# Patient Record
Sex: FEMALE | URBAN - METROPOLITAN AREA
[De-identification: names, ages, dates, MRNs, and addresses within clinical notes are randomized per-mention and may not be internally consistent; named-entity substitution may affect disease eponyms.]

---

## 2017-12-19 ENCOUNTER — OFFICE VISIT (OUTPATIENT)
Dept: PRIMARY CARE CLINIC | Age: 47
End: 2017-12-19

## 2017-12-19 VITALS
RESPIRATION RATE: 14 BRPM | SYSTOLIC BLOOD PRESSURE: 124 MMHG | HEART RATE: 103 BPM | BODY MASS INDEX: 27.29 KG/M2 | OXYGEN SATURATION: 99 % | DIASTOLIC BLOOD PRESSURE: 68 MMHG | HEIGHT: 58 IN | TEMPERATURE: 103.1 F | WEIGHT: 130 LBS

## 2017-12-19 DIAGNOSIS — R68.89 FLU-LIKE SYMPTOMS: ICD-10-CM

## 2017-12-19 DIAGNOSIS — J11.1 INFLUENZA: Primary | ICD-10-CM

## 2017-12-19 DIAGNOSIS — J01.90 ACUTE BACTERIAL SINUSITIS: ICD-10-CM

## 2017-12-19 DIAGNOSIS — B96.89 ACUTE BACTERIAL SINUSITIS: ICD-10-CM

## 2017-12-19 PROCEDURE — 87804 INFLUENZA ASSAY W/OPTIC: CPT | Performed by: FAMILY MEDICINE

## 2017-12-19 PROCEDURE — G8484 FLU IMMUNIZE NO ADMIN: HCPCS | Performed by: FAMILY MEDICINE

## 2017-12-19 PROCEDURE — 1036F TOBACCO NON-USER: CPT | Performed by: FAMILY MEDICINE

## 2017-12-19 PROCEDURE — G8419 CALC BMI OUT NRM PARAM NOF/U: HCPCS | Performed by: FAMILY MEDICINE

## 2017-12-19 PROCEDURE — 99203 OFFICE O/P NEW LOW 30 MIN: CPT | Performed by: FAMILY MEDICINE

## 2017-12-19 PROCEDURE — G8427 DOCREV CUR MEDS BY ELIG CLIN: HCPCS | Performed by: FAMILY MEDICINE

## 2017-12-19 PROCEDURE — 96372 THER/PROPH/DIAG INJ SC/IM: CPT | Performed by: FAMILY MEDICINE

## 2017-12-19 RX ORDER — AZITHROMYCIN 250 MG/1
TABLET, FILM COATED ORAL
Qty: 1 PACKET | Refills: 0 | Status: SHIPPED | OUTPATIENT
Start: 2017-12-19 | End: 2017-12-29

## 2017-12-19 RX ORDER — OSELTAMIVIR PHOSPHATE 75 MG/1
CAPSULE ORAL
Qty: 10 CAPSULE | Refills: 0 | Status: SHIPPED | OUTPATIENT
Start: 2017-12-19 | End: 2018-02-13

## 2017-12-19 RX ORDER — IBUPROFEN 800 MG/1
TABLET ORAL
Qty: 50 TABLET | Refills: 1 | Status: SHIPPED | OUTPATIENT
Start: 2017-12-19 | End: 2020-06-13 | Stop reason: ALTCHOICE

## 2017-12-19 RX ORDER — CEFTRIAXONE 1 G/1
1 INJECTION, POWDER, FOR SOLUTION INTRAMUSCULAR; INTRAVENOUS ONCE
Status: COMPLETED | OUTPATIENT
Start: 2017-12-19 | End: 2017-12-19

## 2017-12-19 RX ADMIN — CEFTRIAXONE 1 G: 1 INJECTION, POWDER, FOR SOLUTION INTRAMUSCULAR; INTRAVENOUS at 18:18

## 2017-12-19 ASSESSMENT — ENCOUNTER SYMPTOMS
SHORTNESS OF BREATH: 0
CHEST TIGHTNESS: 0

## 2017-12-19 NOTE — PROGRESS NOTES
No acute respiratory distress. Alert and oriented times 3. No skin rashes. HEENT:  TMs normal bilaterally. Canals and ears normal. Pharynx is clear. Extraocular eye motions intact and pain free. Pupils reactive and equally round. Sclerae and conjunctivae clear, normocephalic and atraumatic. RESPIRATORY:  Clear and equal breath sounds with no acute respiratory distress. HEART: Regular rhythm without murmur, rub or gallop. ABDOMEN:  soft, nontender. No masses, guarding or rebound. Normoactive bowel sounds. LOW BACK: No tenderness to palpation of inferior lumbar spine or either sacroiliac joint area. Assessment/Plan  Martín Mercado was seen today for establish care and generalized body aches. Diagnoses and all orders for this visit:    Influenza  -     cefTRIAXone (ROCEPHIN) injection 1 g; Inject 1 g into the muscle once    Acute bacterial sinusitis    Flu-like symptoms  -     POCT Influenza A/B    Other orders  -     oseltamivir (TAMIFLU) 75 MG capsule; bid  -     azithromycin (ZITHROMAX) 250 MG tablet; Take 2 tabs (500 mg) on Day 1, and take 1 tab (250 mg) on days 2 through 5.  -     ibuprofen (ADVIL;MOTRIN) 800 MG tablet; Tid prn       Symptoms and complaints should be improving over 48 hours and entirely resolved in two weeks, if not, patient should call the office for a follow-up appointment. Health Maintenance Due   Topic Date Due    HIV screen  11/20/1986    DTaP/Tdap/Td vaccine (1 - Tdap) 11/20/1990    Cervical cancer screen  11/20/1992    Lipid screen  11/20/2011    Diabetes screen  11/20/2011    Flu vaccine (1) 09/01/2017       Controlled Substances Monitoring:      Return in about 2 weeks (around 1/2/2018).     Ping Wheatley MD

## 2018-01-07 ASSESSMENT — ENCOUNTER SYMPTOMS: ABDOMINAL PAIN: 0

## 2018-01-09 LAB
INFLUENZA A ANTIBODY: NORMAL
INFLUENZA B ANTIBODY: NORMAL

## 2018-02-13 ENCOUNTER — TELEPHONE (OUTPATIENT)
Dept: PRIMARY CARE CLINIC | Age: 48
End: 2018-02-13

## 2018-02-13 ENCOUNTER — OFFICE VISIT (OUTPATIENT)
Dept: PRIMARY CARE CLINIC | Age: 48
End: 2018-02-13
Payer: COMMERCIAL

## 2018-02-13 VITALS
SYSTOLIC BLOOD PRESSURE: 122 MMHG | DIASTOLIC BLOOD PRESSURE: 70 MMHG | BODY MASS INDEX: 27.29 KG/M2 | HEIGHT: 58 IN | HEART RATE: 88 BPM | RESPIRATION RATE: 14 BRPM | WEIGHT: 130 LBS | OXYGEN SATURATION: 98 % | TEMPERATURE: 97.8 F

## 2018-02-13 DIAGNOSIS — J40 BRONCHITIS: Primary | ICD-10-CM

## 2018-02-13 DIAGNOSIS — J20.9 ACUTE BRONCHITIS, UNSPECIFIED ORGANISM: ICD-10-CM

## 2018-02-13 DIAGNOSIS — R68.89 FLU-LIKE SYMPTOMS: ICD-10-CM

## 2018-02-13 DIAGNOSIS — R50.9 FEVER, UNSPECIFIED FEVER CAUSE: Primary | ICD-10-CM

## 2018-02-13 DIAGNOSIS — J02.0 STREP THROAT: ICD-10-CM

## 2018-02-13 LAB
INFLUENZA A ANTIBODY: NORMAL
INFLUENZA B ANTIBODY: NORMAL
S PYO AG THROAT QL: POSITIVE

## 2018-02-13 PROCEDURE — 96372 THER/PROPH/DIAG INJ SC/IM: CPT | Performed by: FAMILY MEDICINE

## 2018-02-13 PROCEDURE — 99213 OFFICE O/P EST LOW 20 MIN: CPT | Performed by: FAMILY MEDICINE

## 2018-02-13 PROCEDURE — G8419 CALC BMI OUT NRM PARAM NOF/U: HCPCS | Performed by: FAMILY MEDICINE

## 2018-02-13 PROCEDURE — 87804 INFLUENZA ASSAY W/OPTIC: CPT | Performed by: FAMILY MEDICINE

## 2018-02-13 PROCEDURE — 1036F TOBACCO NON-USER: CPT | Performed by: FAMILY MEDICINE

## 2018-02-13 PROCEDURE — G8484 FLU IMMUNIZE NO ADMIN: HCPCS | Performed by: FAMILY MEDICINE

## 2018-02-13 PROCEDURE — G8427 DOCREV CUR MEDS BY ELIG CLIN: HCPCS | Performed by: FAMILY MEDICINE

## 2018-02-13 PROCEDURE — 87880 STREP A ASSAY W/OPTIC: CPT | Performed by: FAMILY MEDICINE

## 2018-02-13 RX ORDER — PROMETHAZINE HYDROCHLORIDE AND CODEINE PHOSPHATE 6.25; 1 MG/5ML; MG/5ML
5 SYRUP ORAL EVERY 4 HOURS PRN
Qty: 120 ML | Refills: 0 | Status: SHIPPED | OUTPATIENT
Start: 2018-02-13 | End: 2018-02-20

## 2018-02-13 RX ORDER — OSELTAMIVIR PHOSPHATE 75 MG/1
75 CAPSULE ORAL 2 TIMES DAILY
Qty: 10 CAPSULE | Refills: 0 | Status: SHIPPED | OUTPATIENT
Start: 2018-02-13 | End: 2018-02-18

## 2018-02-13 RX ORDER — BENZONATATE 100 MG/1
100 CAPSULE ORAL 3 TIMES DAILY PRN
Qty: 50 CAPSULE | Refills: 0 | Status: SHIPPED | OUTPATIENT
Start: 2018-02-13 | End: 2018-02-20

## 2018-02-13 RX ORDER — CEFDINIR 300 MG/1
300 CAPSULE ORAL 2 TIMES DAILY
Qty: 20 CAPSULE | Refills: 0 | Status: SHIPPED | OUTPATIENT
Start: 2018-02-13 | End: 2018-02-23

## 2018-02-13 RX ORDER — CEFTRIAXONE 1 G/1
1 INJECTION, POWDER, FOR SOLUTION INTRAMUSCULAR; INTRAVENOUS ONCE
Status: COMPLETED | OUTPATIENT
Start: 2018-02-13 | End: 2018-02-13

## 2018-02-13 RX ADMIN — CEFTRIAXONE 1 G: 1 INJECTION, POWDER, FOR SOLUTION INTRAMUSCULAR; INTRAVENOUS at 13:41

## 2018-02-13 ASSESSMENT — PATIENT HEALTH QUESTIONNAIRE - PHQ9
2. FEELING DOWN, DEPRESSED OR HOPELESS: 0
SUM OF ALL RESPONSES TO PHQ QUESTIONS 1-9: 0
1. LITTLE INTEREST OR PLEASURE IN DOING THINGS: 0
SUM OF ALL RESPONSES TO PHQ9 QUESTIONS 1 & 2: 0

## 2018-02-13 ASSESSMENT — ENCOUNTER SYMPTOMS
RHINORRHEA: 1
SORE THROAT: 1
COUGH: 1
SHORTNESS OF BREATH: 0

## 2018-02-13 NOTE — PROGRESS NOTES
Subjective:      Patient ID: Veronica Perez is a 56 y.o. female who presents today for:  Chief Complaint   Patient presents with    Cough     Pt is here for fever, sore throat, congestion, swollen glands, body aches, cough, pt also c/o L ear pain. x since        Cough   This is a new problem. The current episode started in the past 7 days. The problem has been unchanged. The cough is productive of sputum. Associated symptoms include ear pain (left ear), a fever, myalgias, nasal congestion, rhinorrhea and a sore throat. Pertinent negatives include no chest pain, chills, headaches or shortness of breath. Nothing aggravates the symptoms. She has tried nothing for the symptoms. The treatment provided no relief. No past medical history on file. Past Surgical History:   Procedure Laterality Date     SECTION       No family history on file. Social History     Social History    Marital status: Unknown     Spouse name: N/A    Number of children: N/A    Years of education: N/A     Occupational History    Not on file. Social History Main Topics    Smoking status: Never Smoker    Smokeless tobacco: Never Used    Alcohol use No    Drug use: No    Sexual activity: Not on file     Other Topics Concern    Not on file     Social History Narrative    No narrative on file     Allergies: Other    Review of Systems   Constitutional: Positive for fever. Negative for chills. HENT: Positive for ear pain (left ear), rhinorrhea and sore throat. Respiratory: Positive for cough. Negative for shortness of breath. Cardiovascular: Negative for chest pain. Musculoskeletal: Positive for myalgias and neck pain. Neurological: Negative for dizziness and headaches.        Objective:   /70 (Site: Left Arm, Position: Sitting, Cuff Size: Medium Adult)   Pulse 88   Temp 97.8 °F (36.6 °C) (Tympanic)   Resp 14   Ht 4' 10\" (1.473 m)   Wt 130 lb (59 kg)   SpO2 98%   BMI 27.17 kg/m²     Physical

## 2018-02-13 NOTE — TELEPHONE ENCOUNTER
Pharmacy called, pt has a codeine/vicodin allergy. Her throat closed after taking vicodin in the past.  Do you want to send something else to the pharmacy?

## 2018-02-26 ENCOUNTER — OFFICE VISIT (OUTPATIENT)
Dept: PRIMARY CARE CLINIC | Age: 48
End: 2018-02-26
Payer: COMMERCIAL

## 2018-02-26 VITALS
HEIGHT: 58 IN | DIASTOLIC BLOOD PRESSURE: 70 MMHG | HEART RATE: 93 BPM | OXYGEN SATURATION: 97 % | WEIGHT: 131.7 LBS | SYSTOLIC BLOOD PRESSURE: 108 MMHG | TEMPERATURE: 97.6 F | BODY MASS INDEX: 27.65 KG/M2 | RESPIRATION RATE: 16 BRPM

## 2018-02-26 DIAGNOSIS — E78.5 HYPERLIPIDEMIA, UNSPECIFIED HYPERLIPIDEMIA TYPE: ICD-10-CM

## 2018-02-26 DIAGNOSIS — E55.9 VITAMIN D DEFICIENCY: ICD-10-CM

## 2018-02-26 DIAGNOSIS — R53.83 FATIGUE, UNSPECIFIED TYPE: ICD-10-CM

## 2018-02-26 DIAGNOSIS — F43.9 STRESS: Primary | ICD-10-CM

## 2018-02-26 DIAGNOSIS — E03.9 HYPOTHYROIDISM, UNSPECIFIED TYPE: ICD-10-CM

## 2018-02-26 LAB
ALBUMIN SERPL-MCNC: 4.4 G/DL (ref 3.9–4.9)
ALP BLD-CCNC: 48 U/L (ref 40–130)
ALT SERPL-CCNC: 16 U/L (ref 0–33)
ANION GAP SERPL CALCULATED.3IONS-SCNC: 18 MEQ/L (ref 7–13)
AST SERPL-CCNC: 17 U/L (ref 0–35)
BASOPHILS ABSOLUTE: 0.1 K/UL (ref 0–0.2)
BASOPHILS RELATIVE PERCENT: 0.8 %
BILIRUB SERPL-MCNC: 0.2 MG/DL (ref 0–1.2)
BUN BLDV-MCNC: 12 MG/DL (ref 6–20)
CALCIUM SERPL-MCNC: 8.9 MG/DL (ref 8.6–10.2)
CHLORIDE BLD-SCNC: 98 MEQ/L (ref 98–107)
CHOLESTEROL, TOTAL: 207 MG/DL (ref 0–199)
CO2: 24 MEQ/L (ref 22–29)
CREAT SERPL-MCNC: 0.39 MG/DL (ref 0.5–0.9)
EOSINOPHILS ABSOLUTE: 0.7 K/UL (ref 0–0.7)
EOSINOPHILS RELATIVE PERCENT: 5.5 %
GFR AFRICAN AMERICAN: >60
GFR NON-AFRICAN AMERICAN: >60
GLOBULIN: 3.8 G/DL (ref 2.3–3.5)
GLUCOSE BLD-MCNC: 54 MG/DL (ref 74–109)
HCT VFR BLD CALC: 37.2 % (ref 37–47)
HDLC SERPL-MCNC: 36 MG/DL (ref 40–59)
HEMOGLOBIN: 11.8 G/DL (ref 12–16)
IRON SATURATION: 41 % (ref 11–46)
IRON: 112 UG/DL (ref 37–145)
LDL CHOLESTEROL CALCULATED: ABNORMAL MG/DL (ref 0–129)
LYMPHOCYTES ABSOLUTE: 3.5 K/UL (ref 1–4.8)
LYMPHOCYTES RELATIVE PERCENT: 28.9 %
MCH RBC QN AUTO: 25.6 PG (ref 27–31.3)
MCHC RBC AUTO-ENTMCNC: 31.8 % (ref 33–37)
MCV RBC AUTO: 80.4 FL (ref 82–100)
MONOCYTES ABSOLUTE: 0.5 K/UL (ref 0.2–0.8)
MONOCYTES RELATIVE PERCENT: 3.8 %
NEUTROPHILS ABSOLUTE: 7.3 K/UL (ref 1.4–6.5)
NEUTROPHILS RELATIVE PERCENT: 61 %
PDW BLD-RTO: 15 % (ref 11.5–14.5)
PLATELET # BLD: 331 K/UL (ref 130–400)
POTASSIUM SERPL-SCNC: 4.1 MEQ/L (ref 3.5–5.1)
RBC # BLD: 4.63 M/UL (ref 4.2–5.4)
SODIUM BLD-SCNC: 140 MEQ/L (ref 132–144)
T4 TOTAL: 3.9 UG/DL (ref 4.5–11.7)
TOTAL IRON BINDING CAPACITY: 272 UG/DL (ref 178–450)
TOTAL PROTEIN: 8.2 G/DL (ref 6.4–8.1)
TRIGL SERPL-MCNC: 532 MG/DL (ref 0–200)
TSH SERPL DL<=0.05 MIU/L-ACNC: 5.17 UIU/ML (ref 0.27–4.2)
VITAMIN D 25-HYDROXY: 24.6 NG/ML (ref 30–100)
WBC # BLD: 12 K/UL (ref 4.8–10.8)

## 2018-02-26 PROCEDURE — G8419 CALC BMI OUT NRM PARAM NOF/U: HCPCS | Performed by: FAMILY MEDICINE

## 2018-02-26 PROCEDURE — 99214 OFFICE O/P EST MOD 30 MIN: CPT | Performed by: FAMILY MEDICINE

## 2018-02-26 PROCEDURE — 1036F TOBACCO NON-USER: CPT | Performed by: FAMILY MEDICINE

## 2018-02-26 PROCEDURE — G8427 DOCREV CUR MEDS BY ELIG CLIN: HCPCS | Performed by: FAMILY MEDICINE

## 2018-02-26 PROCEDURE — G8484 FLU IMMUNIZE NO ADMIN: HCPCS | Performed by: FAMILY MEDICINE

## 2018-02-26 RX ORDER — PNV NO.95/FERROUS FUM/FOLIC AC 28MG-0.8MG
TABLET ORAL
Qty: 90 TABLET | Refills: 1 | Status: SHIPPED | OUTPATIENT
Start: 2018-02-26 | End: 2020-06-13 | Stop reason: ALTCHOICE

## 2018-02-26 RX ORDER — CITALOPRAM 20 MG/1
TABLET ORAL
Qty: 30 TABLET | Refills: 1 | Status: SHIPPED | OUTPATIENT
Start: 2018-02-26 | End: 2020-06-13 | Stop reason: ALTCHOICE

## 2018-02-26 ASSESSMENT — ENCOUNTER SYMPTOMS
SHORTNESS OF BREATH: 0
ABDOMINAL PAIN: 0
COLOR CHANGE: 0
HEMOPTYSIS: 0
RHINORRHEA: 0
PHOTOPHOBIA: 0
APNEA: 0
SORE THROAT: 0
FACIAL SWELLING: 0
CHOKING: 0
STRIDOR: 0
NAUSEA: 0
CHEST TIGHTNESS: 0
COUGH: 1
EYE REDNESS: 0
CONSTIPATION: 0
DIARRHEA: 0
EYE DISCHARGE: 0
EYE PAIN: 0
HEARTBURN: 0
WHEEZING: 0

## 2018-02-26 NOTE — PROGRESS NOTES
hemoptysis, choking, chest tightness, shortness of breath, wheezing and stridor. Cardiovascular: Negative for chest pain, palpitations and leg swelling. Gastrointestinal: Negative for abdominal pain, constipation, diarrhea, heartburn and nausea. Endocrine: Negative for cold intolerance, heat intolerance, polydipsia and polyphagia. Genitourinary: Negative for dysuria and frequency. Musculoskeletal: Negative for gait problem, joint swelling, myalgias, neck pain and neck stiffness. Skin: Negative for color change, pallor, rash and wound. Allergic/Immunologic: Negative for immunocompromised state. Neurological: Negative for tremors, syncope, facial asymmetry, speech difficulty and headaches. Psychiatric/Behavioral: Negative for confusion and hallucinations. The patient is not nervous/anxious and is not hyperactive. Objective:   /70 (Site: Left Arm, Position: Sitting, Cuff Size: Medium Adult)   Pulse 93   Temp 97.6 °F (36.4 °C) (Temporal)   Resp 16   Ht 4' 10\" (1.473 m)   Wt 131 lb 11.2 oz (59.7 kg)   SpO2 97%   BMI 27.53 kg/m²     Physical Exam      PHYSICAL EXAMINATION:   VITAL SIGNS: are as recorded. GENERAL:  The patient appears well nourished and well-developed, and with normal affect. No acute respiratory distress. Alert and oriented times 3. No skin rashes. HEENT:  TMs normal bilaterally. Canals and ears normal. Pharynx is clear. Extraocular eye motions intact and pain free. Pupils reactive and equally round. Sclerae and conjunctivae clear, normocephalic and atraumatic. RESPIRATORY:  Clear and equal breath sounds with no acute respiratory distress. HEART: Regular rhythm without murmur, rub or gallop. ABDOMEN:  soft, nontender. No masses, guarding or rebound. Normoactive bowel sounds. LOW BACK: No tenderness to palpation of inferior lumbar spine or either sacroiliac joint area. Assessment:     1. Stress  citalopram (CELEXA) 20 MG tablet   2. Fatigue, unspecified type  CBC Auto Differential    Comprehensive Metabolic Panel    Iron and TIBC   3. Vitamin D deficiency  Vitamin D 25 Hydroxy   4. Hypothyroidism, unspecified type  TSH Without Reflex    T4    T3, Uptake   5. Hyperlipidemia, unspecified hyperlipidemia type  Lipid Panel    Prenatal Vit-Fe Fumarate-FA (PRENATAL VITAMINS) 28-0.8 MG TABS       Plan:      Orders Placed This Encounter   Procedures    CBC Auto Differential     Standing Status:   Future     Number of Occurrences:   1     Standing Expiration Date:   2/26/2019    Comprehensive Metabolic Panel     Standing Status:   Future     Number of Occurrences:   1     Standing Expiration Date:   2/26/2019    Lipid Panel     Standing Status:   Future     Number of Occurrences:   1     Standing Expiration Date:   3/26/2018     Order Specific Question:   Is Patient Fasting?/# of Hours     Answer:   yes    TSH Without Reflex     Standing Status:   Future     Number of Occurrences:   1     Standing Expiration Date:   2/26/2019    T4     Standing Status:   Future     Number of Occurrences:   1     Standing Expiration Date:   2/26/2019    T3, Uptake     Standing Status:   Future     Number of Occurrences:   1     Standing Expiration Date:   2/26/2019    Vitamin D 25 Hydroxy     Standing Status:   Future     Number of Occurrences:   1     Standing Expiration Date:   2/26/2019    Iron and TIBC     Standing Status:   Future     Number of Occurrences:   1     Standing Expiration Date:   2/26/2019     Order Specific Question:   Is Patient Fasting? Answer:   yes     Order Specific Question:   No of Hours?      Answer:   4     Orders Placed This Encounter   Medications    citalopram (CELEXA) 20 MG tablet     Sig: One in the morning, one in the afternoon     Dispense:  30 tablet     Refill:  1    Prenatal Vit-Fe Fumarate-FA (PRENATAL VITAMINS) 28-0.8 MG TABS     Sig: qd     Dispense:  90 tablet     Refill:  1     See labs 2/26     stable    Vit d next    Synthroid next    Controlled Substances Monitoring:      Return in about 3 weeks (around 3/19/2018). RACHEL Alejandro  , am scribing for and in the presence of eGnoveva Sneed MD. Electronically signed by :  Stan Ferrara MD, personally performed the services described in this documentation, as scribed by RACHEL Silva   in my presence, and it is both accurate and complete.  Electronically signed by: Genoveva Sneed MD    3/5/18 10:50 PM    Genoveva Sneed MD

## 2018-02-28 LAB — T3 UPTAKE: 37 % (ref 28–41)

## 2020-06-13 ENCOUNTER — OFFICE VISIT (OUTPATIENT)
Dept: ENDOCRINOLOGY | Age: 50
End: 2020-06-13
Payer: COMMERCIAL

## 2020-06-13 VITALS
WEIGHT: 135 LBS | BODY MASS INDEX: 28.34 KG/M2 | HEART RATE: 68 BPM | DIASTOLIC BLOOD PRESSURE: 76 MMHG | SYSTOLIC BLOOD PRESSURE: 124 MMHG | HEIGHT: 58 IN

## 2020-06-13 PROCEDURE — G8419 CALC BMI OUT NRM PARAM NOF/U: HCPCS | Performed by: INTERNAL MEDICINE

## 2020-06-13 PROCEDURE — G8427 DOCREV CUR MEDS BY ELIG CLIN: HCPCS | Performed by: INTERNAL MEDICINE

## 2020-06-13 PROCEDURE — 99243 OFF/OP CNSLTJ NEW/EST LOW 30: CPT | Performed by: INTERNAL MEDICINE

## 2020-06-13 RX ORDER — CHOLECALCIFEROL (VITAMIN D3) 1250 MCG
CAPSULE ORAL
COMMUNITY

## 2020-06-13 RX ORDER — RIBOFLAVIN (VITAMIN B2) 100 MG
100 TABLET ORAL DAILY
COMMUNITY

## 2020-06-13 RX ORDER — MELOXICAM 7.5 MG/1
7.5 TABLET ORAL DAILY
COMMUNITY

## 2020-06-13 RX ORDER — LEVOTHYROXINE SODIUM 0.03 MG/1
25 TABLET ORAL DAILY
COMMUNITY
End: 2020-06-13 | Stop reason: SDUPTHER

## 2020-06-13 RX ORDER — LEVOTHYROXINE SODIUM 0.05 MG/1
25 TABLET ORAL DAILY
Qty: 30 TABLET | Refills: 3 | Status: SHIPPED | OUTPATIENT
Start: 2020-06-13

## 2020-06-13 RX ORDER — CHLORAL HYDRATE 500 MG
1000 CAPSULE ORAL DAILY
COMMUNITY

## 2020-06-13 RX ORDER — EZETIMIBE AND SIMVASTATIN 10; 20 MG/1; MG/1
1 TABLET ORAL NIGHTLY
COMMUNITY

## 2020-06-13 ASSESSMENT — ENCOUNTER SYMPTOMS
SWOLLEN GLANDS: 0
SHORTNESS OF BREATH: 1

## 2020-06-13 NOTE — PROGRESS NOTES
Subjective:      Patient ID: Irais Shannon is a 51 y.o. female. Patient referred here for management of hypothyroidism patient complains of fatigue weight gain and joint pains in her hands knees and lower extremities she also has symptoms of radiculopathy of both upper extremities left worse than right scheduled to have an EMG done which was done through primary care had labs done recently at [facility]  In 5/20 [address] TSH was 4.3-year total T4 was in the low normal range started on Synthroid 25 mcg daily patient also complains of some allergy symptoms shortness of breath TSH done 2 years ago was slightly elevated also had cholesterol of 247 triglyceride was 532 patient on Vytorin   Other   This is a new (     Hypothyroidism) problem. The current episode started more than 1 year ago. The problem has been waxing and waning. Associated symptoms include arthralgias, fatigue, joint swelling and myalgias. Pertinent negatives include no anorexia, chest pain, neck pain or swollen glands. Nothing aggravates the symptoms. Treatments tried:       Synthroid. The treatment provided mild relief. Hyperlipidemia   This is a new problem. Associated symptoms include myalgias and shortness of breath. Pertinent negatives include no chest pain. Current antihyperlipidemic treatment includes statins and ezetimibe. Results for Bebe Hammans (MRN [id number]) as of 6/13/2020 19:30   Ref.  Range 2/26/2018 12:21   Sodium Latest Ref Range: 132 - 144 mEq/L 140   Potassium Latest Ref Range: 3.5 - 5.1 mEq/L 4.1   Chloride Latest Ref Range: 98 - 107 mEq/L 98   CO2 Latest Ref Range: 22 - 29 mEq/L 24   BUN Latest Ref Range: 6 - 20 mg/dL 12   Creatinine Latest Ref Range: 0.50 - 0.90 mg/dL 0.39 (L)   Anion Gap Latest Ref Range: 7 - 13 mEq/L 18 (H)   GFR Non- Latest Ref Range: >60  >60.0   GFR African American Latest Ref Range: >60  >60.0   Glucose Latest Ref Range: 74 - 109 mg/dL 54 (L)   Calcium Latest Ref Range: 8.6 - 10.2 mg/dL 8.9   Total Protein Latest Ref Range: 6.4 - 8.1 g/dL 8.2 (H)   Cholesterol, Total Latest Ref Range: 0 - 199 mg/dL 207 (H)   HDL Cholesterol Latest Ref Range: 40 - 59 mg/dL 36 (L)   LDL Calculated Latest Ref Range: 0 - 129 mg/dL see below   Triglycerides Latest Ref Range: 0 - 200 mg/dL 532 (H)   Albumin Latest Ref Range: 3.9 - 4.9 g/dL 4.4   Globulin Latest Ref Range: 2.3 - 3.5 g/dL 3.8 (H)   Alk Phos Latest Ref Range: 40 - 130 U/L 48   ALT Latest Ref Range: 0 - 33 U/L 16   AST Latest Ref Range: 0 - 35 U/L 17   Bilirubin Latest Ref Range: 0.0 - 1.2 mg/dL 0.2   T4, Total Latest Ref Range: 4.5 - 11.7 ug/dL 3.9 (L)   TSH Latest Ref Range: 0.270 - 4.200 uIU/mL 5.170 (H)         Patient Active Problem List   Diagnosis    Vitamin D deficiency    Stress    Hypothyroidism    Hyperlipidemia     Social History     Socioeconomic History    Marital status: Unknown     Spouse name: Not on file    Number of children: Not on file    Years of education: Not on file    Highest education level: Not on file   Occupational History    Not on file   Social Needs    Financial resource strain: Not on file    Food insecurity     Worry: Not on file     Inability: Not on file    Transportation needs     Medical: Not on file     Non-medical: Not on file   Tobacco Use    Smoking status: Never Smoker    Smokeless tobacco: Never Used   Substance and Sexual Activity    Alcohol use: No    Drug use: No    Sexual activity: Not on file   Lifestyle    Physical activity     Days per week: Not on file     Minutes per session: Not on file    Stress: Not on file   Relationships    Social connections     Talks on phone: Not on file     Gets together: Not on file     Attends [employer] service: Not on file     Active member of club or organization: Not on file     Attends meetings of clubs or organizations: Not on file     Relationship status: Not on file    Intimate partner violence     Fear of current or ex partner: Not on file     Emotionally abused: Not on file     Physically abused: Not on file     Forced sexual activity: Not on file   Other Topics Concern    Not on file   Social History Narrative    Not on file     Past Surgical History:   Procedure Laterality Date     SECTION  2011     History reviewed. No pertinent family history. Allergies   Allergen Reactions    Other        Current Outpatient Medications:     Ascorbic Acid (VITAMIN C) 100 MG tablet, Take 100 mg by mouth daily, Disp: , Rfl:     Cholecalciferol (VITAMIN D3) 1.25 MG (39892 UT) CAPS, Take by mouth, Disp: , Rfl:     ezetimibe-simvastatin (VYTORIN) 10-20 MG per tablet, Take 1 tablet by mouth nightly, Disp: , Rfl:     Omega-3 Fatty Acids (FISH OIL) 1000 MG CAPS, Take 1,000 mg by mouth daily, Disp: , Rfl:     levothyroxine (SYNTHROID) 25 MCG tablet, Take 25 mcg by mouth Daily, Disp: , Rfl:     meloxicam (MOBIC) 7.5 MG tablet, Take 7.5 mg by mouth daily, Disp: , Rfl:     Review of Systems   Constitutional: Positive for fatigue. Respiratory: Positive for shortness of breath. Cardiovascular: Negative for chest pain. Gastrointestinal: Negative for anorexia. Endocrine: Negative for cold intolerance and heat intolerance. Musculoskeletal: Positive for arthralgias, joint swelling and myalgias. Negative for neck pain. Allergic/Immunologic: Positive for environmental allergies. Psychiatric/Behavioral: Positive for dysphoric mood. All other systems reviewed and are negative. Vitals:    20 1008   BP: 124/76   Site: Left Upper Arm   Position: Sitting   Cuff Size: Medium Adult   Pulse: 68   Weight: 135 lb (61.2 kg)   Height: 4' 10\" (1.473 m)       Objective:   Physical Exam  Constitutional:       Appearance: Normal appearance. She is normal weight. HENT:      Head: Normocephalic and atraumatic.       Right Ear: External ear normal.      Left Ear: External ear normal.      Nose: Nose normal.      Mouth/Throat:      Mouth: Mucous Pulmonology     Number of Visits Requested:   1     Increase  Synthroid dose to 50 mcg refer patient to pulmonary for further studies of pulmonary function tests will also get antibodies for Hashimoto thyroiditis and also rheumatoid colitis patient recommended to go ahead and get testing done for EMG nerve conduction study for possible carpal tunnel as as well as radiculopathy  Patient has been hypoparathyroid we have high triglycerides  Orders Placed This Encounter   Medications    levothyroxine (SYNTHROID) 50 MCG tablet     Sig: Take 0.5 tablets by mouth Daily     Dispense:  30 tablet     Refill:  03     More than 50% of 40 minutes spent in patient education, review of lab data thank you for the referral Dr. Mary Castro MD

## 2023-07-05 DIAGNOSIS — E55.9 VITAMIN D DEFICIENCY: ICD-10-CM

## 2023-07-05 RX ORDER — ERGOCALCIFEROL 1.25 MG/1
CAPSULE ORAL
Qty: 12 CAPSULE | Refills: 0 | Status: SHIPPED | OUTPATIENT
Start: 2023-07-05

## 2024-03-05 ENCOUNTER — APPOINTMENT (OUTPATIENT)
Dept: PRIMARY CARE | Facility: CLINIC | Age: 54
End: 2024-03-05
Payer: COMMERCIAL

## 2024-03-12 ENCOUNTER — OFFICE VISIT (OUTPATIENT)
Dept: PRIMARY CARE | Facility: CLINIC | Age: 54
End: 2024-03-12
Payer: COMMERCIAL

## 2024-03-12 VITALS
SYSTOLIC BLOOD PRESSURE: 110 MMHG | DIASTOLIC BLOOD PRESSURE: 72 MMHG | HEIGHT: 58 IN | WEIGHT: 137 LBS | HEART RATE: 62 BPM | BODY MASS INDEX: 28.76 KG/M2 | TEMPERATURE: 98.2 F | RESPIRATION RATE: 15 BRPM | OXYGEN SATURATION: 99 %

## 2024-03-12 DIAGNOSIS — R53.83 FATIGUE, UNSPECIFIED TYPE: ICD-10-CM

## 2024-03-12 DIAGNOSIS — T78.40XA ALLERGIC REACTION, INITIAL ENCOUNTER: Primary | ICD-10-CM

## 2024-03-12 DIAGNOSIS — J06.9 VIRAL UPPER RESPIRATORY TRACT INFECTION: ICD-10-CM

## 2024-03-12 DIAGNOSIS — E78.00 PURE HYPERCHOLESTEROLEMIA: ICD-10-CM

## 2024-03-12 PROCEDURE — 99214 OFFICE O/P EST MOD 30 MIN: CPT | Performed by: FAMILY MEDICINE

## 2024-03-12 PROCEDURE — 87637 SARSCOV2&INF A&B&RSV AMP PRB: CPT | Performed by: FAMILY MEDICINE

## 2024-03-12 RX ORDER — MINERAL OIL
180 ENEMA (ML) RECTAL DAILY
Qty: 30 TABLET | Refills: 0 | Status: SHIPPED | OUTPATIENT
Start: 2024-03-12 | End: 2025-03-12

## 2024-03-12 ASSESSMENT — ENCOUNTER SYMPTOMS
SEIZURES: 0
MYALGIAS: 0
DYSURIA: 0
HEADACHES: 0
COUGH: 0
POLYPHAGIA: 0
BLOOD IN STOOL: 0
NERVOUS/ANXIOUS: 0
SPEECH DIFFICULTY: 0
RECTAL PAIN: 0
ARTHRALGIAS: 0
SHORTNESS OF BREATH: 0
ADENOPATHY: 0
TROUBLE SWALLOWING: 0
DIARRHEA: 0
PHOTOPHOBIA: 0
RHINORRHEA: 0
DECREASED CONCENTRATION: 0
ACTIVITY CHANGE: 0
CHEST TIGHTNESS: 0
SORE THROAT: 0
POLYDIPSIA: 0
COLOR CHANGE: 0
STRIDOR: 0
DIZZINESS: 0
CONSTITUTIONAL NEGATIVE: 1
APPETITE CHANGE: 0
EYE PAIN: 0
NECK STIFFNESS: 0
CONFUSION: 0
FEVER: 0
SLEEP DISTURBANCE: 0
DYSPHORIC MOOD: 0
PALPITATIONS: 0
AGITATION: 0
SINUS PRESSURE: 0
FLANK PAIN: 0
ABDOMINAL DISTENTION: 0
HEMATURIA: 0
FATIGUE: 0
SINUS PAIN: 0
CONSTIPATION: 0
ABDOMINAL PAIN: 0

## 2024-03-12 NOTE — PATIENT INSTRUCTIONS
Continue current medications and therapy for chronic medical conditions.    Patient was advised importance of proper diet/nutrition in addition adequate hydration. Patient was encouraged moderate exercise program to include 30 minutes daily for 5 days of the week or 150 minutes weekly. Patient will follow-up with us as scheduled.    Blood work ordered to include CMP, lipid profile, CBC and thyroid studies    Start Allegra 180 daily    Nasal swab for COVID/RSV and influenza

## 2024-03-12 NOTE — PROGRESS NOTES
Subjective   Patient ID: Amy Gutierrez is a 53 y.o. female who presents for Rash.    Patient would like have a blood work ordered for liver and kidney.    Patient would like have her thyroid levels check.    Patient denies any other symptoms or concerns today.    Rash  This is a recurrent problem. The current episode started 1 to 4 weeks ago. The problem has been gradually worsening since onset. The affected locations include the abdomen, left shoulder, left arm, left elbow, left lower leg, left upper leg, right lower leg, right upper leg, face, chest, torso, right shoulder, right arm and neck. The rash is characterized by itchiness, pain, swelling and redness. She was exposed to nothing. Associated symptoms include facial edema. Pertinent negatives include no congestion, cough, diarrhea, eye pain, fatigue, fever, joint pain, rhinorrhea, shortness of breath or sore throat. (Loss apetite) Past treatments include antihistamine and moisturizer. The treatment provided no relief.        Review of Systems   Constitutional: Negative.  Negative for activity change, appetite change, fatigue and fever.   HENT:  Negative for congestion, dental problem, ear discharge, ear pain, mouth sores, rhinorrhea, sinus pressure, sinus pain, sore throat, tinnitus and trouble swallowing.    Eyes:  Negative for photophobia, pain and visual disturbance.   Respiratory:  Negative for cough, chest tightness, shortness of breath and stridor.    Cardiovascular:  Negative for chest pain and palpitations.   Gastrointestinal:  Negative for abdominal distention, abdominal pain, blood in stool, constipation, diarrhea and rectal pain.   Endocrine: Negative for cold intolerance, heat intolerance, polydipsia, polyphagia and polyuria.   Genitourinary:  Negative for dysuria, flank pain, hematuria and urgency.   Musculoskeletal:  Negative for arthralgias, gait problem, joint pain, myalgias and neck stiffness.   Skin:  Positive for rash. Negative for color  "change.   Allergic/Immunologic: Negative for environmental allergies and food allergies.   Neurological:  Negative for dizziness, seizures, syncope, speech difficulty and headaches.   Hematological:  Negative for adenopathy.   Psychiatric/Behavioral:  Negative for agitation, confusion, decreased concentration, dysphoric mood and sleep disturbance. The patient is not nervous/anxious.        Objective   /72 (BP Location: Right arm, Patient Position: Sitting, BP Cuff Size: Adult)   Pulse 62   Temp 36.8 °C (98.2 °F)   Resp 15   Ht 1.473 m (4' 10\")   Wt 62.1 kg (137 lb)   SpO2 99%   BMI 28.63 kg/m²     Physical Exam  Vitals reviewed.   Constitutional:       General: She is not in acute distress.     Appearance: Normal appearance. She is normal weight. She is not ill-appearing or diaphoretic.   HENT:      Head: Normocephalic.      Right Ear: Tympanic membrane and external ear normal.      Left Ear: Tympanic membrane and external ear normal.      Nose: Nose normal. No congestion.      Mouth/Throat:      Pharynx: No posterior oropharyngeal erythema.   Eyes:      General:         Right eye: No discharge.         Left eye: No discharge.      Extraocular Movements: Extraocular movements intact.      Conjunctiva/sclera: Conjunctivae normal.      Pupils: Pupils are equal, round, and reactive to light.   Cardiovascular:      Rate and Rhythm: Normal rate and regular rhythm.      Pulses: Normal pulses.      Heart sounds: Normal heart sounds. No murmur heard.  Pulmonary:      Effort: Pulmonary effort is normal. No respiratory distress.      Breath sounds: Normal breath sounds. No wheezing or rales.   Chest:      Chest wall: No tenderness.   Abdominal:      General: Abdomen is flat. Bowel sounds are normal. There is no distension.      Palpations: There is no mass.      Tenderness: There is no abdominal tenderness. There is no guarding.   Musculoskeletal:         General: No tenderness. Normal range of motion.      " Cervical back: Normal range of motion and neck supple. No tenderness.      Right lower leg: No edema.      Left lower leg: No edema.   Skin:     General: Skin is dry.      Coloration: Skin is not jaundiced.      Findings: Erythema and rash present. No bruising.      Comments: Dry skin   Neurological:      General: No focal deficit present.      Mental Status: She is alert and oriented to person, place, and time. Mental status is at baseline.      Cranial Nerves: No cranial nerve deficit.      Sensory: No sensory deficit.      Coordination: Coordination normal.      Gait: Gait normal.   Psychiatric:         Mood and Affect: Mood normal.         Thought Content: Thought content normal.         Judgment: Judgment normal.         Assessment/Plan   Problem List Items Addressed This Visit    None  Visit Diagnoses         Codes    Allergic reaction, initial encounter    -  Primary T78.40XA    Relevant Medications    fexofenadine (Allegra) 180 mg tablet    Fatigue, unspecified type     R53.83    Relevant Orders    Comprehensive Metabolic Panel (Completed)    CBC and Auto Differential (Completed)    Lipid Panel (Completed)    Thyroid Stimulating Hormone (Completed)    T4, free (Completed)    Viral upper respiratory tract infection     J06.9    Relevant Orders    Sars-CoV-2 and Influenza A/B PCR (Completed)    RSV PCR (Completed)             Scribe Attestation  By signing my name below, I, Panfilo Martinez DO , Scribe   attest that this documentation has been prepared under the direction and in the presence of Panfilo Martinez DO.    Provider Attestation - Scribe documentation    All medical record entries made by the Scribe were at my direction and personally dictated by me. I have reviewed the chart and agree that the record accurately reflects my personal performance of the history, physical exam, discussion and plan.

## 2024-03-13 ENCOUNTER — LAB (OUTPATIENT)
Dept: LAB | Facility: LAB | Age: 54
End: 2024-03-13
Payer: COMMERCIAL

## 2024-03-13 DIAGNOSIS — R53.83 FATIGUE, UNSPECIFIED TYPE: ICD-10-CM

## 2024-03-13 LAB
ALBUMIN SERPL BCP-MCNC: 4.4 G/DL (ref 3.4–5)
ALP SERPL-CCNC: 47 U/L (ref 33–110)
ALT SERPL W P-5'-P-CCNC: 81 U/L (ref 7–45)
ANION GAP SERPL CALC-SCNC: 13 MMOL/L (ref 10–20)
AST SERPL W P-5'-P-CCNC: 55 U/L (ref 9–39)
BASOPHILS # BLD AUTO: 0.05 X10*3/UL (ref 0–0.1)
BASOPHILS NFR BLD AUTO: 0.8 %
BILIRUB SERPL-MCNC: 0.3 MG/DL (ref 0–1.2)
BUN SERPL-MCNC: 15 MG/DL (ref 6–23)
CALCIUM SERPL-MCNC: 9.1 MG/DL (ref 8.6–10.6)
CHLORIDE SERPL-SCNC: 104 MMOL/L (ref 98–107)
CHOLEST SERPL-MCNC: 277 MG/DL (ref 0–199)
CHOLESTEROL/HDL RATIO: 6.3
CO2 SERPL-SCNC: 27 MMOL/L (ref 21–32)
CREAT SERPL-MCNC: 0.62 MG/DL (ref 0.5–1.05)
EGFRCR SERPLBLD CKD-EPI 2021: >90 ML/MIN/1.73M*2
EOSINOPHIL # BLD AUTO: 0.55 X10*3/UL (ref 0–0.7)
EOSINOPHIL NFR BLD AUTO: 9.1 %
ERYTHROCYTE [DISTWIDTH] IN BLOOD BY AUTOMATED COUNT: 14.6 % (ref 11.5–14.5)
FLUAV RNA RESP QL NAA+PROBE: NOT DETECTED
FLUBV RNA RESP QL NAA+PROBE: NOT DETECTED
GLUCOSE SERPL-MCNC: 104 MG/DL (ref 74–99)
HCT VFR BLD AUTO: 36.9 % (ref 36–46)
HDLC SERPL-MCNC: 43.7 MG/DL
HGB BLD-MCNC: 11.9 G/DL (ref 12–16)
IMM GRANULOCYTES # BLD AUTO: 0.01 X10*3/UL (ref 0–0.7)
IMM GRANULOCYTES NFR BLD AUTO: 0.2 % (ref 0–0.9)
LDLC SERPL CALC-MCNC: 177 MG/DL
LYMPHOCYTES # BLD AUTO: 2.53 X10*3/UL (ref 1.2–4.8)
LYMPHOCYTES NFR BLD AUTO: 42 %
MCH RBC QN AUTO: 25.5 PG (ref 26–34)
MCHC RBC AUTO-ENTMCNC: 32.2 G/DL (ref 32–36)
MCV RBC AUTO: 79 FL (ref 80–100)
MONOCYTES # BLD AUTO: 0.42 X10*3/UL (ref 0.1–1)
MONOCYTES NFR BLD AUTO: 7 %
NEUTROPHILS # BLD AUTO: 2.47 X10*3/UL (ref 1.2–7.7)
NEUTROPHILS NFR BLD AUTO: 40.9 %
NON HDL CHOLESTEROL: 233 MG/DL (ref 0–149)
NRBC BLD-RTO: 0 /100 WBCS (ref 0–0)
PLATELET # BLD AUTO: 276 X10*3/UL (ref 150–450)
POTASSIUM SERPL-SCNC: 4.1 MMOL/L (ref 3.5–5.3)
PROT SERPL-MCNC: 7.1 G/DL (ref 6.4–8.2)
RBC # BLD AUTO: 4.66 X10*6/UL (ref 4–5.2)
RSV RNA RESP QL NAA+PROBE: NOT DETECTED
SARS-COV-2 RNA RESP QL NAA+PROBE: NOT DETECTED
SODIUM SERPL-SCNC: 140 MMOL/L (ref 136–145)
T4 FREE SERPL-MCNC: 1.04 NG/DL (ref 0.78–1.48)
TRIGL SERPL-MCNC: 280 MG/DL (ref 0–149)
TSH SERPL-ACNC: 3.42 MIU/L (ref 0.44–3.98)
VLDL: 56 MG/DL (ref 0–40)
WBC # BLD AUTO: 6 X10*3/UL (ref 4.4–11.3)

## 2024-03-13 PROCEDURE — 80061 LIPID PANEL: CPT

## 2024-03-13 PROCEDURE — 85025 COMPLETE CBC W/AUTO DIFF WBC: CPT

## 2024-03-13 PROCEDURE — 80053 COMPREHEN METABOLIC PANEL: CPT

## 2024-03-13 PROCEDURE — 84443 ASSAY THYROID STIM HORMONE: CPT

## 2024-03-13 PROCEDURE — 36415 COLL VENOUS BLD VENIPUNCTURE: CPT

## 2024-03-13 PROCEDURE — 84439 ASSAY OF FREE THYROXINE: CPT

## 2024-03-14 DIAGNOSIS — E78.5 DYSLIPIDEMIA: ICD-10-CM

## 2024-03-14 DIAGNOSIS — K76.0 FATTY LIVER DISEASE, NONALCOHOLIC: ICD-10-CM

## 2024-03-14 NOTE — TELEPHONE ENCOUNTER
Dr. Martinez Pt    Pt  calling to to request a call back from  to go over lab results for wife that were done on 3/13. Pt  said that if she needs anything called in to please send to     [id number] - [place], [place] - [address] [address] [address]       Please call Dariel at  [phone]

## 2024-03-15 NOTE — TELEPHONE ENCOUNTER
Per Sammy elevated cholesterol and elevated LFTs  Start crestor 10 mg at bedtime  Start CoQ-10 100 mg daily    Recommend low cholesterol diet.    Repeat CMP and lipid labs in June.    Spoke with  to advise.

## 2024-03-17 RX ORDER — ROSUVASTATIN CALCIUM 10 MG/1
10 TABLET, COATED ORAL DAILY
Qty: 30 TABLET | Refills: 3 | Status: SHIPPED | OUTPATIENT
Start: 2024-03-17 | End: 2025-03-17

## 2024-03-18 RX ORDER — ROSUVASTATIN CALCIUM 10 MG/1
10 TABLET, COATED ORAL NIGHTLY
Qty: 30 TABLET | Refills: 2 | Status: SHIPPED | OUTPATIENT
Start: 2024-03-18

## 2024-03-18 RX ORDER — EPINEPHRINE 0.22MG
100 AEROSOL WITH ADAPTER (ML) INHALATION DAILY
Qty: 30 CAPSULE | Refills: 2 | Status: SHIPPED | OUTPATIENT
Start: 2024-03-18 | End: 2025-03-18

## 2024-03-18 NOTE — TELEPHONE ENCOUNTER
----- Message from Panfilo Martinez DO sent at 3/17/2024  2:32 PM EDT -----  Review of laboratory results indicates normal thyroid studies, however, cholesterol panel is elevated.  Recommend starting Crestor 10 mg nightly.  Also, recommend low-cholesterol diet to include minimizing red meat, fried foods and cheeses.  Repeat fasting lipid profile, CMP and 3 months.  Thank you

## 2024-04-30 DIAGNOSIS — E03.9 ACQUIRED HYPOTHYROIDISM: ICD-10-CM

## 2024-05-01 RX ORDER — LEVOTHYROXINE SODIUM 25 UG/1
25 TABLET ORAL DAILY
Qty: 90 TABLET | Refills: 0 | Status: SHIPPED | OUTPATIENT
Start: 2024-05-01

## 2024-08-08 ENCOUNTER — APPOINTMENT (OUTPATIENT)
Dept: PRIMARY CARE | Facility: CLINIC | Age: 54
End: 2024-08-08
Payer: COMMERCIAL

## 2024-09-24 DIAGNOSIS — E55.9 VITAMIN D DEFICIENCY: ICD-10-CM

## 2024-09-24 RX ORDER — ERGOCALCIFEROL 1.25 MG/1
CAPSULE ORAL
Qty: 12 CAPSULE | Refills: 0 | Status: SHIPPED | OUTPATIENT
Start: 2024-09-24

## 2024-09-30 ENCOUNTER — APPOINTMENT (OUTPATIENT)
Dept: PRIMARY CARE | Facility: CLINIC | Age: 54
End: 2024-09-30
Payer: COMMERCIAL

## 2024-09-30 VITALS
WEIGHT: 137 LBS | BODY MASS INDEX: 28.76 KG/M2 | DIASTOLIC BLOOD PRESSURE: 80 MMHG | HEIGHT: 58 IN | OXYGEN SATURATION: 98 % | TEMPERATURE: 98 F | RESPIRATION RATE: 16 BRPM | SYSTOLIC BLOOD PRESSURE: 110 MMHG | HEART RATE: 67 BPM

## 2024-09-30 DIAGNOSIS — N95.1 MENOPAUSAL SYMPTOMS: ICD-10-CM

## 2024-09-30 DIAGNOSIS — E55.9 VITAMIN D DEFICIENCY: ICD-10-CM

## 2024-09-30 DIAGNOSIS — E03.9 ACQUIRED HYPOTHYROIDISM: Primary | ICD-10-CM

## 2024-09-30 DIAGNOSIS — E78.00 PURE HYPERCHOLESTEROLEMIA: ICD-10-CM

## 2024-09-30 DIAGNOSIS — R53.83 FATIGUE, UNSPECIFIED TYPE: ICD-10-CM

## 2024-09-30 DIAGNOSIS — Z12.31 ENCOUNTER FOR SCREENING MAMMOGRAM FOR MALIGNANT NEOPLASM OF BREAST: ICD-10-CM

## 2024-09-30 PROCEDURE — 99214 OFFICE O/P EST MOD 30 MIN: CPT | Performed by: FAMILY MEDICINE

## 2024-09-30 RX ORDER — LEVOTHYROXINE SODIUM 25 UG/1
25 TABLET ORAL DAILY
Qty: 90 TABLET | Refills: 1 | Status: SHIPPED | OUTPATIENT
Start: 2024-09-30

## 2024-09-30 RX ORDER — ERGOCALCIFEROL 1.25 MG/1
1 CAPSULE ORAL
Qty: 12 CAPSULE | Refills: 1 | Status: SHIPPED | OUTPATIENT
Start: 2024-09-30

## 2024-09-30 ASSESSMENT — ENCOUNTER SYMPTOMS
MYALGIAS: 0
FLANK PAIN: 0
STRIDOR: 0
POLYDIPSIA: 0
PHOTOPHOBIA: 0
ADENOPATHY: 0
DYSURIA: 0
FATIGUE: 1
SPEECH DIFFICULTY: 0
SEIZURES: 0
DYSPHORIC MOOD: 0
COLOR CHANGE: 0
AGITATION: 1
CONSTIPATION: 0
NECK STIFFNESS: 0
DECREASED CONCENTRATION: 1
SHORTNESS OF BREATH: 0
POLYPHAGIA: 0
APPETITE CHANGE: 0
BLOOD IN STOOL: 0
FEVER: 0
DIZZINESS: 0
SINUS PRESSURE: 0
TROUBLE SWALLOWING: 0
SORE THROAT: 0
NERVOUS/ANXIOUS: 0
ACTIVITY CHANGE: 0
DIARRHEA: 0
EYE PAIN: 0
CONFUSION: 0
COUGH: 0
SINUS PAIN: 0
HEADACHES: 0
ARTHRALGIAS: 0
HEMATURIA: 0
ABDOMINAL PAIN: 0
CHEST TIGHTNESS: 0
ABDOMINAL DISTENTION: 0
SLEEP DISTURBANCE: 1
RHINORRHEA: 0
PALPITATIONS: 0
RECTAL PAIN: 0

## 2024-09-30 NOTE — PATIENT INSTRUCTIONS
Follow up in 4 weeks     Schedule pap with Annie     Continue current medications and therapy for chronic medical conditions.    Patient was advised importance of proper diet/nutrition in addition adequate hydration. Patient was encouraged moderate exercise program to include 30 minutes daily for 5 days of the week or 150 minutes weekly. Patient will follow-up with us as scheduled.    Obtain Fasting Lipid, CMP, CBC, TSH, T4, Folate, B12, Iron/TIBC, Vitamin D, Estrogen, FSH/LH    Obtain Mammogram     Declines SSRI at this time    Advise use of black cohosh or primrose oil    Consider Climara patch once screening mammogram has been obtained    Consider Veozah

## 2024-09-30 NOTE — PROGRESS NOTES
"Subjective   Patient ID: Amy Gutierrez is a 53 y.o. female who presents for Hypothyroidism.    Patient presents in office for a follow up of Hypothyroidism. Patient is currently being prescribed Levothyroxine. Patient admits she has been experiencing fatigue, hair loss and sleeping difficulties. Patient admits she has been experiencing difficulties with hot flashes and mood swings.          Review of Systems   Constitutional:  Positive for fatigue. Negative for activity change, appetite change and fever.   HENT:  Negative for congestion, dental problem, ear discharge, ear pain, mouth sores, rhinorrhea, sinus pressure, sinus pain, sore throat, tinnitus and trouble swallowing.    Eyes:  Negative for photophobia, pain and visual disturbance.   Respiratory:  Negative for cough, chest tightness, shortness of breath and stridor.    Cardiovascular:  Negative for chest pain and palpitations.   Gastrointestinal:  Negative for abdominal distention, abdominal pain, blood in stool, constipation, diarrhea and rectal pain.   Endocrine: Negative for cold intolerance, heat intolerance, polydipsia, polyphagia and polyuria.   Genitourinary:  Negative for dysuria, flank pain, hematuria and urgency.   Musculoskeletal:  Negative for arthralgias, gait problem, myalgias and neck stiffness.   Skin:  Negative for color change and rash.   Allergic/Immunologic: Negative for environmental allergies and food allergies.   Neurological:  Negative for dizziness, seizures, syncope, speech difficulty and headaches.   Hematological:  Negative for adenopathy.   Psychiatric/Behavioral:  Positive for agitation, decreased concentration and sleep disturbance. Negative for confusion and dysphoric mood. The patient is not nervous/anxious.        Objective   /80 (BP Location: Right arm, Patient Position: Sitting, BP Cuff Size: Adult)   Pulse 67   Temp 36.7 °C (98 °F) (Temporal)   Resp 16   Ht 1.473 m (4' 10\")   Wt 62.1 kg (137 lb)   SpO2 98%   " BMI 28.63 kg/m²     Physical Exam  Vitals reviewed.   Constitutional:       General: She is not in acute distress.     Appearance: Normal appearance. She is normal weight. She is not ill-appearing or diaphoretic.   HENT:      Head: Normocephalic.      Right Ear: Tympanic membrane and external ear normal.      Left Ear: Tympanic membrane and external ear normal.      Nose: Nose normal. No congestion.      Mouth/Throat:      Pharynx: No posterior oropharyngeal erythema.   Eyes:      General:         Right eye: No discharge.         Left eye: No discharge.      Extraocular Movements: Extraocular movements intact.      Conjunctiva/sclera: Conjunctivae normal.      Pupils: Pupils are equal, round, and reactive to light.   Cardiovascular:      Rate and Rhythm: Normal rate and regular rhythm.      Pulses: Normal pulses.      Heart sounds: Normal heart sounds. No murmur heard.  Pulmonary:      Effort: Pulmonary effort is normal. No respiratory distress.      Breath sounds: Normal breath sounds. No wheezing or rales.   Chest:      Chest wall: No tenderness.   Abdominal:      General: Abdomen is flat. Bowel sounds are normal. There is no distension.      Palpations: There is no mass.      Tenderness: There is no abdominal tenderness. There is no guarding.   Musculoskeletal:         General: No tenderness. Normal range of motion.      Cervical back: Normal range of motion and neck supple. No tenderness.      Right lower leg: No edema.      Left lower leg: No edema.   Skin:     General: Skin is dry.      Coloration: Skin is not jaundiced.      Findings: No bruising, erythema or rash.   Neurological:      General: No focal deficit present.      Mental Status: She is alert and oriented to person, place, and time. Mental status is at baseline.      Cranial Nerves: No cranial nerve deficit.      Sensory: No sensory deficit.      Coordination: Coordination normal.      Gait: Gait normal.   Psychiatric:         Thought Content:  Thought content normal.         Judgment: Judgment normal.      Comments: Irritable/anxious         Assessment/Plan   Problem List Items Addressed This Visit    None  Visit Diagnoses         Codes    Acquired hypothyroidism    -  Primary E03.9    Relevant Medications    levothyroxine (Synthroid, Levoxyl) 25 mcg tablet    Other Relevant Orders    Free T4 Index    Thyroid Stimulating Hormone    Vitamin D deficiency     E55.9    Relevant Medications    ergocalciferol (Vitamin D-2) 1.25 MG (95497 UT) capsule    Other Relevant Orders    Vitamin D 25-Hydroxy,Total (for eval of Vitamin D levels)    Encounter for screening mammogram for malignant neoplasm of breast     Z12.31    Relevant Orders    BI mammo bilateral screening tomosynthesis    Pure hypercholesterolemia     E78.00    Relevant Orders    Comprehensive Metabolic Panel    CBC and Auto Differential    Lipid Panel    Fatigue, unspecified type     R53.83    Relevant Orders    Vitamin B12    Folate    Iron and TIBC    Cortisol    DHEA level    Menopausal symptoms     N95.1    Relevant Orders    Estrogens, Total    FSH & LH    Cortisol    DHEA level              Scribe Attestation  By signing my name below, IDilia RMA, Scribe   attest that this documentation has been prepared under the direction and in the presence of Panfilo Martinez DO.   Provider Attestation - Scribe documentation    All medical record entries made by the Scribe were at my direction and personally dictated by me. I have reviewed the chart and agree that the record accurately reflects my personal performance of the history, physical exam, discussion and plan.

## 2024-10-01 ENCOUNTER — HOSPITAL ENCOUNTER (OUTPATIENT)
Dept: RADIOLOGY | Facility: CLINIC | Age: 54
Discharge: HOME | End: 2024-10-01
Payer: COMMERCIAL

## 2024-10-01 ENCOUNTER — LAB (OUTPATIENT)
Dept: LAB | Facility: LAB | Age: 54
End: 2024-10-01
Payer: COMMERCIAL

## 2024-10-01 VITALS — BODY MASS INDEX: 28.76 KG/M2 | WEIGHT: 137 LBS | HEIGHT: 58 IN

## 2024-10-01 DIAGNOSIS — E78.00 PURE HYPERCHOLESTEROLEMIA: ICD-10-CM

## 2024-10-01 DIAGNOSIS — R53.83 FATIGUE, UNSPECIFIED TYPE: ICD-10-CM

## 2024-10-01 DIAGNOSIS — Z12.31 ENCOUNTER FOR SCREENING MAMMOGRAM FOR MALIGNANT NEOPLASM OF BREAST: ICD-10-CM

## 2024-10-01 DIAGNOSIS — E03.9 ACQUIRED HYPOTHYROIDISM: ICD-10-CM

## 2024-10-01 DIAGNOSIS — E55.9 VITAMIN D DEFICIENCY: ICD-10-CM

## 2024-10-01 DIAGNOSIS — N95.1 MENOPAUSAL SYMPTOMS: ICD-10-CM

## 2024-10-01 LAB
25(OH)D3 SERPL-MCNC: 79 NG/ML (ref 30–100)
ALBUMIN SERPL BCP-MCNC: 4.4 G/DL (ref 3.4–5)
ALP SERPL-CCNC: 51 U/L (ref 33–110)
ALT SERPL W P-5'-P-CCNC: 30 U/L (ref 7–45)
ANION GAP SERPL CALC-SCNC: 16 MMOL/L (ref 10–20)
AST SERPL W P-5'-P-CCNC: 24 U/L (ref 9–39)
BASOPHILS # BLD AUTO: 0.03 X10*3/UL (ref 0–0.1)
BASOPHILS NFR BLD AUTO: 0.5 %
BILIRUB SERPL-MCNC: 0.3 MG/DL (ref 0–1.2)
BUN SERPL-MCNC: 23 MG/DL (ref 6–23)
CALCIUM SERPL-MCNC: 9.5 MG/DL (ref 8.6–10.6)
CHLORIDE SERPL-SCNC: 105 MMOL/L (ref 98–107)
CHOLEST SERPL-MCNC: 194 MG/DL (ref 0–199)
CHOLESTEROL/HDL RATIO: 4.3
CO2 SERPL-SCNC: 27 MMOL/L (ref 21–32)
CORTIS SERPL-MCNC: 9.2 UG/DL (ref 2.5–20)
CREAT SERPL-MCNC: 0.62 MG/DL (ref 0.5–1.05)
EGFRCR SERPLBLD CKD-EPI 2021: >90 ML/MIN/1.73M*2
EOSINOPHIL # BLD AUTO: 0.23 X10*3/UL (ref 0–0.7)
EOSINOPHIL NFR BLD AUTO: 4.1 %
ERYTHROCYTE [DISTWIDTH] IN BLOOD BY AUTOMATED COUNT: 13.4 % (ref 11.5–14.5)
FOLATE SERPL-MCNC: >24 NG/ML
FSH SERPL-ACNC: 60.4 IU/L
FT4I SERPL CALC-MCNC: 1.5 (ref 1.6–4.7)
GLUCOSE SERPL-MCNC: 96 MG/DL (ref 74–99)
HCT VFR BLD AUTO: 37.1 % (ref 36–46)
HDLC SERPL-MCNC: 44.9 MG/DL
HGB BLD-MCNC: 11.9 G/DL (ref 12–16)
IMM GRANULOCYTES # BLD AUTO: 0 X10*3/UL (ref 0–0.7)
IMM GRANULOCYTES NFR BLD AUTO: 0 % (ref 0–0.9)
IRON SATN MFR SERPL: 31 % (ref 25–45)
IRON SERPL-MCNC: 102 UG/DL (ref 35–150)
LDLC SERPL CALC-MCNC: 112 MG/DL
LH SERPL-ACNC: 24.6 IU/L
LYMPHOCYTES # BLD AUTO: 2.84 X10*3/UL (ref 1.2–4.8)
LYMPHOCYTES NFR BLD AUTO: 50.6 %
MCH RBC QN AUTO: 25.3 PG (ref 26–34)
MCHC RBC AUTO-ENTMCNC: 32.1 G/DL (ref 32–36)
MCV RBC AUTO: 79 FL (ref 80–100)
MONOCYTES # BLD AUTO: 0.31 X10*3/UL (ref 0.1–1)
MONOCYTES NFR BLD AUTO: 5.5 %
NEUTROPHILS # BLD AUTO: 2.2 X10*3/UL (ref 1.2–7.7)
NEUTROPHILS NFR BLD AUTO: 39.3 %
NON HDL CHOLESTEROL: 149 MG/DL (ref 0–149)
NRBC BLD-RTO: 0 /100 WBCS (ref 0–0)
PLATELET # BLD AUTO: 278 X10*3/UL (ref 150–450)
POTASSIUM SERPL-SCNC: 4.5 MMOL/L (ref 3.5–5.3)
PROT SERPL-MCNC: 7.8 G/DL (ref 6.4–8.2)
RBC # BLD AUTO: 4.71 X10*6/UL (ref 4–5.2)
SODIUM SERPL-SCNC: 143 MMOL/L (ref 136–145)
T3RU NFR SERPL: 36 % (ref 24–41)
T4 SERPL-MCNC: 4.2 UG/DL (ref 4.5–11.1)
TIBC SERPL-MCNC: 332 UG/DL (ref 240–445)
TRIGL SERPL-MCNC: 187 MG/DL (ref 0–149)
TSH SERPL-ACNC: 4.53 MIU/L (ref 0.44–3.98)
UIBC SERPL-MCNC: 230 UG/DL (ref 110–370)
VIT B12 SERPL-MCNC: 1602 PG/ML (ref 211–911)
VLDL: 37 MG/DL (ref 0–40)
WBC # BLD AUTO: 5.6 X10*3/UL (ref 4.4–11.3)

## 2024-10-01 PROCEDURE — 82306 VITAMIN D 25 HYDROXY: CPT

## 2024-10-01 PROCEDURE — 83550 IRON BINDING TEST: CPT

## 2024-10-01 PROCEDURE — 83002 ASSAY OF GONADOTROPIN (LH): CPT

## 2024-10-01 PROCEDURE — 82626 DEHYDROEPIANDROSTERONE: CPT

## 2024-10-01 PROCEDURE — 83540 ASSAY OF IRON: CPT

## 2024-10-01 PROCEDURE — 82746 ASSAY OF FOLIC ACID SERUM: CPT

## 2024-10-01 PROCEDURE — 85025 COMPLETE CBC W/AUTO DIFF WBC: CPT

## 2024-10-01 PROCEDURE — 80053 COMPREHEN METABOLIC PANEL: CPT

## 2024-10-01 PROCEDURE — 82672 ASSAY OF ESTROGEN: CPT

## 2024-10-01 PROCEDURE — 80061 LIPID PANEL: CPT

## 2024-10-01 PROCEDURE — 82607 VITAMIN B-12: CPT

## 2024-10-01 PROCEDURE — 77063 BREAST TOMOSYNTHESIS BI: CPT | Performed by: STUDENT IN AN ORGANIZED HEALTH CARE EDUCATION/TRAINING PROGRAM

## 2024-10-01 PROCEDURE — 84443 ASSAY THYROID STIM HORMONE: CPT

## 2024-10-01 PROCEDURE — 84436 ASSAY OF TOTAL THYROXINE: CPT

## 2024-10-01 PROCEDURE — 77067 SCR MAMMO BI INCL CAD: CPT

## 2024-10-01 PROCEDURE — 77067 SCR MAMMO BI INCL CAD: CPT | Performed by: STUDENT IN AN ORGANIZED HEALTH CARE EDUCATION/TRAINING PROGRAM

## 2024-10-01 PROCEDURE — 36415 COLL VENOUS BLD VENIPUNCTURE: CPT

## 2024-10-01 PROCEDURE — 83001 ASSAY OF GONADOTROPIN (FSH): CPT

## 2024-10-01 PROCEDURE — 82533 TOTAL CORTISOL: CPT

## 2024-10-01 PROCEDURE — 84479 ASSAY OF THYROID (T3 OR T4): CPT

## 2024-10-04 LAB — DHEA SERPL-MCNC: 2.39 NG/ML (ref 0.63–4.7)

## 2024-10-06 LAB — ESTROGEN SERPL-MCNC: 69 PG/ML

## 2024-10-10 ENCOUNTER — APPOINTMENT (OUTPATIENT)
Dept: PRIMARY CARE | Facility: CLINIC | Age: 54
End: 2024-10-10
Payer: COMMERCIAL

## 2024-10-10 VITALS
HEART RATE: 79 BPM | SYSTOLIC BLOOD PRESSURE: 110 MMHG | DIASTOLIC BLOOD PRESSURE: 70 MMHG | OXYGEN SATURATION: 98 % | RESPIRATION RATE: 16 BRPM | HEIGHT: 58 IN | BODY MASS INDEX: 28.76 KG/M2 | WEIGHT: 137 LBS | TEMPERATURE: 97 F

## 2024-10-10 DIAGNOSIS — E03.9 ACQUIRED HYPOTHYROIDISM: ICD-10-CM

## 2024-10-10 DIAGNOSIS — E66.3 OVERWEIGHT WITH BODY MASS INDEX (BMI) OF 28 TO 28.9 IN ADULT: ICD-10-CM

## 2024-10-10 DIAGNOSIS — Z01.419 ENCOUNTER FOR ANNUAL ROUTINE GYNECOLOGICAL EXAMINATION: Primary | ICD-10-CM

## 2024-10-10 DIAGNOSIS — Z12.4 SCREENING FOR CERVICAL CANCER: ICD-10-CM

## 2024-10-10 PROCEDURE — 99396 PREV VISIT EST AGE 40-64: CPT | Performed by: NURSE PRACTITIONER

## 2024-10-10 RX ORDER — LEVOTHYROXINE SODIUM 25 UG/1
25 TABLET ORAL DAILY
Qty: 90 TABLET | Refills: 1 | Status: SHIPPED | OUTPATIENT
Start: 2024-10-10

## 2024-10-10 ASSESSMENT — ENCOUNTER SYMPTOMS
PALPITATIONS: 0
SORE THROAT: 0
SHORTNESS OF BREATH: 0
ABDOMINAL PAIN: 0
CONSTIPATION: 0
FEVER: 0
SINUS PAIN: 0
NAUSEA: 0
DIARRHEA: 0
DYSURIA: 0
HEADACHES: 0
CHILLS: 0
VOMITING: 0
FATIGUE: 0
WHEEZING: 0
COUGH: 0
WEAKNESS: 0
BACK PAIN: 0
MYALGIAS: 0
SINUS PRESSURE: 0
DIZZINESS: 0

## 2024-10-10 NOTE — PROGRESS NOTES
"Subjective   Patient ID: Amy Gutierrez is a 53 y.o. female who presents for Gynecologic Exam and Results.    Patient is being seen today for regular Pap and to go over mammogram results. Patient denies any concerns during today's visit.     Gynecologic Exam  The patient's pertinent negatives include no pelvic pain or vaginal discharge. Pertinent negatives include no abdominal pain, back pain, chills, constipation, diarrhea, dysuria, fever, headaches, nausea, rash, sore throat, urgency or vomiting.     53 y.o. female who presents for her annual exam.  Concerns Today:  Hot flashes, vaginal dryness    Prior Pap History: Unknown    Past Medical History: Hypothyroidism, Hyperlipidemia    Allergies: Patient has no known allergies.  Patient's last menstrual period was 3 years ago  Obstetrical History:     Review of Systems   Constitutional:  Negative for chills, fatigue and fever.   HENT:  Negative for congestion, sinus pressure, sinus pain and sore throat.    Respiratory:  Negative for cough, shortness of breath and wheezing.    Cardiovascular:  Negative for chest pain and palpitations.   Gastrointestinal:  Negative for abdominal pain, constipation, diarrhea, nausea and vomiting.   Genitourinary:  Negative for dyspareunia, dysuria, menstrual problem, pelvic pain, urgency, vaginal discharge and vaginal pain.   Musculoskeletal:  Negative for back pain and myalgias.   Skin:  Negative for rash.   Neurological:  Negative for dizziness, weakness and headaches.       Objective   /70 (BP Location: Left arm, Patient Position: Sitting, BP Cuff Size: Adult)   Pulse 79   Temp 36.1 °C (97 °F) (Temporal)   Resp 16   Ht 1.473 m (4' 10\")   Wt 62.1 kg (137 lb)   SpO2 98%   BMI 28.63 kg/m²     Physical Exam  Vitals and nursing note reviewed. Exam conducted with a chaperone present.   Constitutional:       General: She is not in acute distress.     Appearance: Normal appearance.   Cardiovascular:      Rate and Rhythm: " Normal rate and regular rhythm.      Heart sounds: Normal heart sounds.   Pulmonary:      Effort: Pulmonary effort is normal.      Breath sounds: Normal breath sounds.   Abdominal:      General: Abdomen is flat. Bowel sounds are normal. There is no distension.      Palpations: Abdomen is soft.      Tenderness: There is no abdominal tenderness.      Hernia: No hernia is present.   Genitourinary:     Pubic Area: No rash.       Vagina: No vaginal discharge or tenderness.      Cervix: No cervical motion tenderness, friability or lesion.      Uterus: Normal.       Adnexa: Right adnexa normal and left adnexa normal.   Lymphadenopathy:      Cervical: No cervical adenopathy.   Skin:     General: Skin is warm and dry.   Neurological:      Mental Status: She is alert.   Psychiatric:         Mood and Affect: Mood normal.         Behavior: Behavior normal.         Assessment/Plan   Problem List Items Addressed This Visit    None  Visit Diagnoses         Codes    Encounter for annual routine gynecological examination    -  Primary Z01.419    Relevant Orders    THINPREP PAP TEST    Acquired hypothyroidism     E03.9    Relevant Medications    levothyroxine (Synthroid, Levoxyl) 25 mcg tablet    Overweight with body mass index (BMI) of 28 to 28.9 in adult     E66.3, Z68.28    Screening for cervical cancer     Z12.4    Relevant Orders    THINPREP PAP TEST          Annual Exam:  Pap - Collected  Normal mammogram from 10/1/24 reviewed with patient  Encouraged regular self breast exams, healthy diet and regular exercise.  Follow up as needed with any additional concerns.

## 2024-10-23 LAB
CYTOLOGY CMNT CVX/VAG CYTO-IMP: NORMAL
LAB AP HPV GENOTYPE QUESTION: YES
LAB AP HPV HR: NORMAL
LABORATORY COMMENT REPORT: NORMAL
PATH REPORT.TOTAL CANCER: NORMAL

## 2024-10-30 ENCOUNTER — APPOINTMENT (OUTPATIENT)
Dept: PRIMARY CARE | Facility: CLINIC | Age: 54
End: 2024-10-30
Payer: COMMERCIAL

## 2024-12-26 ENCOUNTER — APPOINTMENT (OUTPATIENT)
Dept: PRIMARY CARE | Facility: CLINIC | Age: 54
End: 2024-12-26
Payer: COMMERCIAL

## 2024-12-26 VITALS
DIASTOLIC BLOOD PRESSURE: 56 MMHG | OXYGEN SATURATION: 97 % | HEIGHT: 58 IN | SYSTOLIC BLOOD PRESSURE: 108 MMHG | HEART RATE: 80 BPM | RESPIRATION RATE: 16 BRPM | WEIGHT: 140.8 LBS | BODY MASS INDEX: 29.56 KG/M2 | TEMPERATURE: 97.5 F

## 2024-12-26 DIAGNOSIS — E78.2 MIXED HYPERLIPIDEMIA: ICD-10-CM

## 2024-12-26 DIAGNOSIS — E03.9 ACQUIRED HYPOTHYROIDISM: ICD-10-CM

## 2024-12-26 DIAGNOSIS — N95.1 VAGINAL DRYNESS, MENOPAUSAL: ICD-10-CM

## 2024-12-26 DIAGNOSIS — F32.9 REACTIVE DEPRESSION: ICD-10-CM

## 2024-12-26 DIAGNOSIS — N95.1 VASOMOTOR SYMPTOMS DUE TO MENOPAUSE: ICD-10-CM

## 2024-12-26 DIAGNOSIS — N95.1 MENOPAUSAL STATE: Primary | ICD-10-CM

## 2024-12-26 PROBLEM — E78.5 HYPERLIPIDEMIA: Status: ACTIVE | Noted: 2018-02-26

## 2024-12-26 PROBLEM — E55.9 VITAMIN D DEFICIENCY: Status: ACTIVE | Noted: 2018-02-26

## 2024-12-26 PROBLEM — D64.9 ANEMIA: Status: ACTIVE | Noted: 2024-12-26

## 2024-12-26 PROCEDURE — 99214 OFFICE O/P EST MOD 30 MIN: CPT | Performed by: FAMILY MEDICINE

## 2024-12-26 RX ORDER — SERTRALINE HYDROCHLORIDE 25 MG/1
25 TABLET, FILM COATED ORAL DAILY
Qty: 30 TABLET | Refills: 1 | Status: SHIPPED | OUTPATIENT
Start: 2024-12-26 | End: 2025-02-24

## 2024-12-26 RX ORDER — ESTRADIOL 0.05 MG/D
1 PATCH TRANSDERMAL WEEKLY
Qty: 4 PATCH | Refills: 2 | Status: SHIPPED | OUTPATIENT
Start: 2024-12-26 | End: 2025-03-20

## 2024-12-26 RX ORDER — LEVOTHYROXINE SODIUM 50 UG/1
50 TABLET ORAL DAILY
Qty: 90 TABLET | Refills: 1 | Status: SHIPPED | OUTPATIENT
Start: 2024-12-26

## 2024-12-26 ASSESSMENT — ENCOUNTER SYMPTOMS
NECK STIFFNESS: 0
STRIDOR: 0
DIZZINESS: 0
CONSTIPATION: 0
COLOR CHANGE: 0
COUGH: 0
SINUS PRESSURE: 0
SPEECH DIFFICULTY: 0
CONSTITUTIONAL NEGATIVE: 1
SEIZURES: 0
SINUS PAIN: 0
PHOTOPHOBIA: 0
SHORTNESS OF BREATH: 0
ABDOMINAL DISTENTION: 0
POLYPHAGIA: 0
DYSPHORIC MOOD: 1
EYE PAIN: 0
FLANK PAIN: 0
HEMATURIA: 0
LOSS OF SENSATION IN FEET: 0
ABDOMINAL PAIN: 0
AGITATION: 1
FATIGUE: 0
DEPRESSION: 1
DIARRHEA: 0
ADENOPATHY: 0
SORE THROAT: 0
TROUBLE SWALLOWING: 0
PALPITATIONS: 0
DYSURIA: 0
APPETITE CHANGE: 0
ACTIVITY CHANGE: 0
DECREASED CONCENTRATION: 0
POLYDIPSIA: 0
MYALGIAS: 0
HEADACHES: 0
CHEST TIGHTNESS: 0
SLEEP DISTURBANCE: 0
ARTHRALGIAS: 0
FEVER: 0
BLOOD IN STOOL: 0
CONFUSION: 0
RHINORRHEA: 0
NERVOUS/ANXIOUS: 1
OCCASIONAL FEELINGS OF UNSTEADINESS: 0
RECTAL PAIN: 0

## 2024-12-26 ASSESSMENT — PATIENT HEALTH QUESTIONNAIRE - PHQ9
2. FEELING DOWN, DEPRESSED OR HOPELESS: NOT AT ALL
1. LITTLE INTEREST OR PLEASURE IN DOING THINGS: NOT AT ALL
SUM OF ALL RESPONSES TO PHQ9 QUESTIONS 1 AND 2: 0

## 2024-12-26 NOTE — PATIENT INSTRUCTIONS
Follow up in 3 months    Continue current medications and therapy for chronic medical conditions.    Patient was advised importance of proper diet/nutrition in addition adequate hydration. Patient was encouraged moderate exercise program to include 30 minutes daily for 5 days of the week or 150 minutes weekly. Patient will follow-up with us as scheduled.    Increase Levothyroxine to 50 mcg once daily     Obtain TSH and T4    Start Estradiol patch once weekly     Start Sertraline 25mg one daily     Take b12 every other day    Referred to Gynecology

## 2024-12-26 NOTE — PROGRESS NOTES
"Subjective   Patient ID: Amy Gutierrez is a 54 y.o. female who presents for Results.    HPI   Patient is at the office today to discuss BW results from 10/01/2024.    Patient would like to discuss possible prescription for estrogen patches today.    Review of Systems   Constitutional: Negative.  Negative for activity change, appetite change, fatigue and fever.   HENT:  Negative for congestion, dental problem, ear discharge, ear pain, mouth sores, rhinorrhea, sinus pressure, sinus pain, sore throat, tinnitus and trouble swallowing.    Eyes:  Negative for photophobia, pain and visual disturbance.   Respiratory:  Negative for cough, chest tightness, shortness of breath and stridor.    Cardiovascular:  Negative for chest pain and palpitations.   Gastrointestinal:  Negative for abdominal distention, abdominal pain, blood in stool, constipation, diarrhea and rectal pain.   Endocrine: Negative for cold intolerance, heat intolerance, polydipsia, polyphagia and polyuria.   Genitourinary:  Negative for dysuria, flank pain, hematuria and urgency.   Musculoskeletal:  Negative for arthralgias, gait problem, myalgias and neck stiffness.   Skin:  Negative for color change and rash.   Allergic/Immunologic: Negative for environmental allergies and food allergies.   Neurological:  Negative for dizziness, seizures, syncope, speech difficulty and headaches.   Hematological:  Negative for adenopathy.   Psychiatric/Behavioral:  Positive for agitation and dysphoric mood. Negative for confusion, decreased concentration and sleep disturbance. The patient is nervous/anxious.        Objective   /56 (BP Location: Right arm, Patient Position: Sitting, BP Cuff Size: Adult)   Pulse 80   Temp 36.4 °C (97.5 °F) (Temporal)   Resp 16   Ht 1.473 m (4' 10\")   Wt 63.9 kg (140 lb 12.8 oz)   SpO2 97%   BMI 29.43 kg/m²     Physical Exam  Vitals reviewed.   Constitutional:       General: She is not in acute distress.     Appearance: Normal " appearance. She is normal weight. She is not ill-appearing or diaphoretic.   HENT:      Head: Normocephalic.      Right Ear: Tympanic membrane and external ear normal.      Left Ear: Tympanic membrane and external ear normal.      Nose: Nose normal. No congestion.      Mouth/Throat:      Pharynx: No posterior oropharyngeal erythema.   Eyes:      General:         Right eye: No discharge.         Left eye: No discharge.      Extraocular Movements: Extraocular movements intact.      Conjunctiva/sclera: Conjunctivae normal.      Pupils: Pupils are equal, round, and reactive to light.   Cardiovascular:      Rate and Rhythm: Normal rate and regular rhythm.      Pulses: Normal pulses.      Heart sounds: Normal heart sounds. No murmur heard.  Pulmonary:      Effort: Pulmonary effort is normal. No respiratory distress.      Breath sounds: Normal breath sounds. No wheezing or rales.   Chest:      Chest wall: No tenderness.   Abdominal:      General: Abdomen is flat. Bowel sounds are normal. There is no distension.      Palpations: There is no mass.      Tenderness: There is no abdominal tenderness. There is no guarding.   Musculoskeletal:         General: No tenderness. Normal range of motion.      Cervical back: Normal range of motion and neck supple. No tenderness.      Right lower leg: No edema.      Left lower leg: No edema.   Skin:     General: Skin is dry.      Coloration: Skin is not jaundiced.      Findings: No bruising, erythema or rash.   Neurological:      General: No focal deficit present.      Mental Status: She is alert and oriented to person, place, and time. Mental status is at baseline.      Cranial Nerves: No cranial nerve deficit.      Sensory: No sensory deficit.      Coordination: Coordination normal.      Gait: Gait normal.   Psychiatric:         Thought Content: Thought content normal.         Judgment: Judgment normal.      Comments: Anxious         Assessment/Plan   Problem List Items Addressed This  Visit             ICD-10-CM    Acquired hypothyroidism E03.9    Relevant Medications    levothyroxine (Synthroid, Levoxyl) 50 mcg tablet    Other Relevant Orders    Thyroid Stimulating Hormone    Free T4 Index    Hyperlipidemia E78.5     Other Visit Diagnoses         Codes    Menopausal state    -  Primary N95.1    Relevant Medications    estradiol (Climara) 0.05 mg/24 hr patch    sertraline (Zoloft) 25 mg tablet    Other Relevant Orders    Referral to Gynecology    Vasomotor symptoms due to menopause     N95.1    Reactive depression     F32.9    Relevant Medications    sertraline (Zoloft) 25 mg tablet    Vaginal dryness, menopausal     N95.1    Relevant Orders    Referral to Gynecology              Scribe Attestation  By signing my name below, IDilia RMA , [place]   attest that this documentation has been prepared under the direction and in the presence of Panfilo Martinez DO.   Provider Attestation - Scribe documentation    All medical record entries made by the Scribe were at my direction and personally dictated by me. I have reviewed the chart and agree that the record accurately reflects my personal performance of the history, physical exam, discussion and plan.

## 2025-01-13 ENCOUNTER — LAB (OUTPATIENT)
Dept: LAB | Facility: LAB | Age: 55
End: 2025-01-13
Payer: COMMERCIAL

## 2025-01-13 DIAGNOSIS — E78.5 DYSLIPIDEMIA: ICD-10-CM

## 2025-01-13 DIAGNOSIS — E03.9 ACQUIRED HYPOTHYROIDISM: ICD-10-CM

## 2025-01-13 DIAGNOSIS — K76.0 FATTY LIVER DISEASE, NONALCOHOLIC: ICD-10-CM

## 2025-01-13 PROCEDURE — 84479 ASSAY OF THYROID (T3 OR T4): CPT

## 2025-01-13 PROCEDURE — 80061 LIPID PANEL: CPT

## 2025-01-13 PROCEDURE — 84443 ASSAY THYROID STIM HORMONE: CPT

## 2025-01-13 PROCEDURE — 80053 COMPREHEN METABOLIC PANEL: CPT

## 2025-01-13 PROCEDURE — 84436 ASSAY OF TOTAL THYROXINE: CPT

## 2025-01-14 LAB
ALBUMIN SERPL BCP-MCNC: 4.6 G/DL (ref 3.4–5)
ALP SERPL-CCNC: 49 U/L (ref 33–110)
ALT SERPL W P-5'-P-CCNC: 25 U/L (ref 7–45)
ANION GAP SERPL CALC-SCNC: 13 MMOL/L (ref 10–20)
AST SERPL W P-5'-P-CCNC: 22 U/L (ref 9–39)
BILIRUB SERPL-MCNC: 0.4 MG/DL (ref 0–1.2)
BUN SERPL-MCNC: 15 MG/DL (ref 6–23)
CALCIUM SERPL-MCNC: 9.7 MG/DL (ref 8.6–10.6)
CHLORIDE SERPL-SCNC: 102 MMOL/L (ref 98–107)
CHOLEST SERPL-MCNC: 277 MG/DL (ref 0–199)
CHOLESTEROL/HDL RATIO: 5.6
CO2 SERPL-SCNC: 29 MMOL/L (ref 21–32)
CREAT SERPL-MCNC: 0.57 MG/DL (ref 0.5–1.05)
EGFRCR SERPLBLD CKD-EPI 2021: >90 ML/MIN/1.73M*2
FT4I SERPL CALC-MCNC: 1.8 (ref 1.6–4.7)
GLUCOSE SERPL-MCNC: 83 MG/DL (ref 74–99)
HDLC SERPL-MCNC: 49.7 MG/DL
LDLC SERPL CALC-MCNC: 182 MG/DL
NON HDL CHOLESTEROL: 227 MG/DL (ref 0–149)
POTASSIUM SERPL-SCNC: 4 MMOL/L (ref 3.5–5.3)
PROT SERPL-MCNC: 7.7 G/DL (ref 6.4–8.2)
SODIUM SERPL-SCNC: 140 MMOL/L (ref 136–145)
T3RU NFR SERPL: 45 % (ref 24–41)
T4 SERPL-MCNC: 3.9 UG/DL (ref 4.5–11.1)
TRIGL SERPL-MCNC: 227 MG/DL (ref 0–149)
TSH SERPL-ACNC: 2.04 MIU/L (ref 0.44–3.98)
VLDL: 45 MG/DL (ref 0–40)

## 2025-03-11 ENCOUNTER — APPOINTMENT (OUTPATIENT)
Dept: PRIMARY CARE | Facility: CLINIC | Age: 55
End: 2025-03-11
Payer: COMMERCIAL

## 2025-03-12 ENCOUNTER — HOSPITAL ENCOUNTER (OUTPATIENT)
Dept: RADIOLOGY | Facility: CLINIC | Age: 55
Discharge: HOME | End: 2025-03-12
Payer: COMMERCIAL

## 2025-03-12 ENCOUNTER — APPOINTMENT (OUTPATIENT)
Dept: PRIMARY CARE | Facility: CLINIC | Age: 55
End: 2025-03-12
Payer: COMMERCIAL

## 2025-03-12 VITALS
TEMPERATURE: 97.6 F | SYSTOLIC BLOOD PRESSURE: 114 MMHG | HEART RATE: 77 BPM | WEIGHT: 141 LBS | RESPIRATION RATE: 19 BRPM | DIASTOLIC BLOOD PRESSURE: 62 MMHG | OXYGEN SATURATION: 98 % | HEIGHT: 58 IN | BODY MASS INDEX: 29.6 KG/M2

## 2025-03-12 DIAGNOSIS — N39.3 STRESS INCONTINENCE OF URINE: Primary | ICD-10-CM

## 2025-03-12 DIAGNOSIS — R05.1 ACUTE COUGH: ICD-10-CM

## 2025-03-12 DIAGNOSIS — E55.9 VITAMIN D DEFICIENCY: ICD-10-CM

## 2025-03-12 DIAGNOSIS — E78.5 DYSLIPIDEMIA: ICD-10-CM

## 2025-03-12 DIAGNOSIS — N95.1 MENOPAUSAL STATE: ICD-10-CM

## 2025-03-12 DIAGNOSIS — E03.9 ACQUIRED HYPOTHYROIDISM: ICD-10-CM

## 2025-03-12 DIAGNOSIS — K76.0 FATTY LIVER DISEASE, NONALCOHOLIC: ICD-10-CM

## 2025-03-12 DIAGNOSIS — R07.2 PRECORDIAL PAIN: ICD-10-CM

## 2025-03-12 DIAGNOSIS — F32.9 REACTIVE DEPRESSION: ICD-10-CM

## 2025-03-12 PROCEDURE — 99214 OFFICE O/P EST MOD 30 MIN: CPT | Performed by: FAMILY MEDICINE

## 2025-03-12 PROCEDURE — 93000 ELECTROCARDIOGRAM COMPLETE: CPT | Performed by: FAMILY MEDICINE

## 2025-03-12 PROCEDURE — 71046 X-RAY EXAM CHEST 2 VIEWS: CPT | Performed by: RADIOLOGY

## 2025-03-12 PROCEDURE — 71046 X-RAY EXAM CHEST 2 VIEWS: CPT

## 2025-03-12 RX ORDER — ESTRADIOL 0.05 MG/D
1 PATCH TRANSDERMAL WEEKLY
Qty: 4 PATCH | Refills: 2 | Status: SHIPPED | OUTPATIENT
Start: 2025-03-12 | End: 2025-06-04

## 2025-03-12 RX ORDER — MIRABEGRON 25 MG/1
25 TABLET, FILM COATED, EXTENDED RELEASE ORAL DAILY
Qty: 30 TABLET | Refills: 1 | Status: SHIPPED | OUTPATIENT
Start: 2025-03-12

## 2025-03-12 RX ORDER — SERTRALINE HYDROCHLORIDE 25 MG/1
25 TABLET, FILM COATED ORAL DAILY
Qty: 90 TABLET | Refills: 1 | Status: SHIPPED | OUTPATIENT
Start: 2025-03-12 | End: 2026-03-12

## 2025-03-12 RX ORDER — LEVOTHYROXINE SODIUM 50 UG/1
50 TABLET ORAL DAILY
Qty: 90 TABLET | Refills: 1 | Status: SHIPPED | OUTPATIENT
Start: 2025-03-12

## 2025-03-12 RX ORDER — ERGOCALCIFEROL 1.25 MG/1
1 CAPSULE ORAL
Qty: 12 CAPSULE | Refills: 1 | Status: SHIPPED | OUTPATIENT
Start: 2025-03-16

## 2025-03-12 RX ORDER — UBIDECARENONE 100 MG
100 CAPSULE ORAL DAILY
Qty: 90 CAPSULE | Refills: 1 | Status: SHIPPED | OUTPATIENT
Start: 2025-03-12 | End: 2026-03-12

## 2025-03-12 RX ORDER — AZITHROMYCIN 250 MG/1
TABLET, FILM COATED ORAL
Qty: 6 TABLET | Refills: 0 | Status: SHIPPED | OUTPATIENT
Start: 2025-03-12 | End: 2025-03-17

## 2025-03-12 RX ORDER — ROSUVASTATIN CALCIUM 10 MG/1
10 TABLET, COATED ORAL NIGHTLY
Qty: 90 TABLET | Refills: 0 | Status: SHIPPED | OUTPATIENT
Start: 2025-03-12

## 2025-03-12 ASSESSMENT — ENCOUNTER SYMPTOMS
COUGH: 1
OCCASIONAL FEELINGS OF UNSTEADINESS: 0
DEPRESSION: 0
LOSS OF SENSATION IN FEET: 0

## 2025-03-12 ASSESSMENT — PATIENT HEALTH QUESTIONNAIRE - PHQ9
10. IF YOU CHECKED OFF ANY PROBLEMS, HOW DIFFICULT HAVE THESE PROBLEMS MADE IT FOR YOU TO DO YOUR WORK, TAKE CARE OF THINGS AT HOME, OR GET ALONG WITH OTHER PEOPLE: SOMEWHAT DIFFICULT
1. LITTLE INTEREST OR PLEASURE IN DOING THINGS: NOT AT ALL
2. FEELING DOWN, DEPRESSED OR HOPELESS: SEVERAL DAYS
SUM OF ALL RESPONSES TO PHQ9 QUESTIONS 1 AND 2: 1

## 2025-03-12 NOTE — PROGRESS NOTES
Subjective   Patient ID: Amy Gutierrez is a 54 y.o. female who presents for Urinary Incontinence.    Patient states having urinary incontinence. Patient states having the symptoms for severe years but now is worse.       Patient denies any other symptoms or concerns today.    Cough  This is a recurrent problem. The current episode started in the past 7 days. The problem has been gradually improving. The problem occurs every few hours. The cough is Productive of sputum (yellow). Pertinent negatives include no chest pain, ear pain, fever, headaches, myalgias, rash, rhinorrhea, sore throat or shortness of breath. Associated symptoms comments: tired. Nothing aggravates the symptoms. She has tried OTC cough suppressant for the symptoms. The treatment provided mild relief. There is no history of environmental allergies.        Review of Systems   Constitutional: Negative.  Negative for activity change, appetite change, fatigue and fever.   HENT:  Negative for congestion, dental problem, ear discharge, ear pain, mouth sores, rhinorrhea, sinus pressure, sinus pain, sore throat, tinnitus and trouble swallowing.    Eyes:  Negative for photophobia, pain and visual disturbance.   Respiratory:  Positive for cough. Negative for chest tightness, shortness of breath and stridor.    Cardiovascular:  Negative for chest pain and palpitations.   Gastrointestinal:  Negative for abdominal distention, abdominal pain, blood in stool, constipation, diarrhea and rectal pain.   Endocrine: Negative for cold intolerance, heat intolerance, polydipsia, polyphagia and polyuria.   Genitourinary:  Positive for frequency. Negative for dysuria, flank pain, hematuria and urgency.   Musculoskeletal:  Positive for arthralgias. Negative for gait problem, myalgias and neck stiffness.   Skin:  Negative for color change and rash.   Allergic/Immunologic: Negative for environmental allergies and food allergies.   Neurological:  Negative for dizziness,  "seizures, syncope, speech difficulty and headaches.   Hematological:  Negative for adenopathy.   Psychiatric/Behavioral:  Negative for agitation, confusion, decreased concentration, dysphoric mood and sleep disturbance. The patient is not nervous/anxious.        Objective   /62 (BP Location: Right arm, Patient Position: Sitting, BP Cuff Size: Adult)   Pulse 77   Temp 36.4 °C (97.6 °F) (Skin)   Resp 19   Ht 1.473 m (4' 10\")   Wt 64 kg (141 lb)   SpO2 98%   BMI 29.47 kg/m²     Physical Exam  Vitals reviewed.   Constitutional:       General: She is not in acute distress.     Appearance: Normal appearance. She is normal weight. She is not ill-appearing or diaphoretic.   HENT:      Head: Normocephalic.      Right Ear: Tympanic membrane and external ear normal.      Left Ear: Tympanic membrane and external ear normal.      Nose: Nose normal. No congestion.      Mouth/Throat:      Pharynx: No posterior oropharyngeal erythema.   Eyes:      General:         Right eye: No discharge.         Left eye: No discharge.      Extraocular Movements: Extraocular movements intact.      Conjunctiva/sclera: Conjunctivae normal.      Pupils: Pupils are equal, round, and reactive to light.   Cardiovascular:      Rate and Rhythm: Normal rate and regular rhythm.      Pulses: Normal pulses.      Heart sounds: Normal heart sounds. No murmur heard.  Pulmonary:      Effort: Pulmonary effort is normal. No respiratory distress.      Breath sounds: Normal breath sounds. No wheezing or rales.   Chest:      Chest wall: No tenderness.   Abdominal:      General: Abdomen is flat. Bowel sounds are normal. There is no distension.      Palpations: There is no mass.      Tenderness: There is no abdominal tenderness. There is no guarding.   Musculoskeletal:         General: No tenderness. Normal range of motion.      Cervical back: Normal range of motion and neck supple. No tenderness.      Right lower leg: No edema.      Left lower leg: No " edema.   Skin:     General: Skin is dry.      Coloration: Skin is not jaundiced.      Findings: No bruising, erythema or rash.   Neurological:      General: No focal deficit present.      Mental Status: She is alert and oriented to person, place, and time. Mental status is at baseline.      Cranial Nerves: No cranial nerve deficit.      Sensory: No sensory deficit.      Coordination: Coordination normal.      Gait: Gait normal.   Psychiatric:         Mood and Affect: Mood normal.         Thought Content: Thought content normal.         Judgment: Judgment normal.         Assessment/Plan   Problem List Items Addressed This Visit             ICD-10-CM    Acquired hypothyroidism E03.9    Relevant Medications    levothyroxine (Synthroid, Levoxyl) 50 mcg tablet    Vitamin D deficiency E55.9    Relevant Medications    ergocalciferol (Vitamin D-2) 1250 mcg (50,000 units) capsule     Other Visit Diagnoses         Codes    Stress incontinence of urine    -  Primary N39.3    Relevant Medications    mirabegron (Myrbetriq) 25 mg tablet extended release 24 hr 24 hr tablet    Acute cough     R05.1    Relevant Medications    azithromycin (Zithromax) 250 mg tablet    Other Relevant Orders    XR chest 2 views (Completed)    Menopausal state     N95.1    Relevant Medications    estradiol (Climara) 0.05 mg/24 hr patch    sertraline (Zoloft) 25 mg tablet    Dyslipidemia     E78.5    Relevant Medications    rosuvastatin (Crestor) 10 mg tablet    Reactive depression     F32.9    Relevant Medications    sertraline (Zoloft) 25 mg tablet    Fatty liver disease, nonalcoholic     K76.0    Relevant Medications    coenzyme Q-10 (CoQ-10) 100 mg capsule    Precordial pain     R07.2    Relevant Orders    ECG 12 lead (Clinic Performed) (Completed)    Referral to Cardiology          Provider Attestation - Scribe documentation    All medical record entries made by the Scribe were at my direction and personally dictated by me. I have reviewed the chart  and agree that the record accurately reflects my personal performance of the history, physical exam, discussion and plan.

## 2025-03-12 NOTE — PATIENT INSTRUCTIONS
Follow up in 4 weeks    Continue current medications and therapy for chronic medical conditions.    Patient was advised importance of proper diet/nutrition in addition adequate hydration. Patient was encouraged moderate exercise program to include 30 minutes daily for 5 days of the week or 150 minutes weekly. Patient will follow-up with us as scheduled.    I personally reviewed the [facility] report for this patient. I have considered the risks of abuse, dependence, addiction, and diversion.    UDS/CSA:    Start Climara patch weekly    Start Z-Siddharth x 1    Start Myrbetriq 25 mg daily    Start Zoloft 25 mg daily    EKG in office    Obtain chest x-ray/PA lateral    Refer to cardiology    Start vitamin D 50,000-units weekly

## 2025-03-16 ASSESSMENT — ENCOUNTER SYMPTOMS
CONFUSION: 0
FREQUENCY: 1
FLANK PAIN: 0
SINUS PRESSURE: 0
SINUS PAIN: 0
POLYDIPSIA: 0
SEIZURES: 0
SPEECH DIFFICULTY: 0
RHINORRHEA: 0
PALPITATIONS: 0
HEADACHES: 0
DIZZINESS: 0
ARTHRALGIAS: 1
ABDOMINAL PAIN: 0
FATIGUE: 0
RECTAL PAIN: 0
BLOOD IN STOOL: 0
ADENOPATHY: 0
NECK STIFFNESS: 0
STRIDOR: 0
SORE THROAT: 0
DYSURIA: 0
FEVER: 0
DIARRHEA: 0
HEMATURIA: 0
CHEST TIGHTNESS: 0
CONSTITUTIONAL NEGATIVE: 1
AGITATION: 0
DECREASED CONCENTRATION: 0
CONSTIPATION: 0
MYALGIAS: 0
ACTIVITY CHANGE: 0
NERVOUS/ANXIOUS: 0
PHOTOPHOBIA: 0
SLEEP DISTURBANCE: 0
EYE PAIN: 0
DYSPHORIC MOOD: 0
APPETITE CHANGE: 0
TROUBLE SWALLOWING: 0
POLYPHAGIA: 0
COLOR CHANGE: 0
ABDOMINAL DISTENTION: 0
SHORTNESS OF BREATH: 0

## 2025-05-20 ENCOUNTER — HOSPITAL ENCOUNTER (OUTPATIENT)
Dept: CARDIOLOGY | Facility: CLINIC | Age: 55
Discharge: HOME | End: 2025-05-20
Payer: COMMERCIAL

## 2025-05-20 ENCOUNTER — OFFICE VISIT (OUTPATIENT)
Dept: CARDIOLOGY | Facility: CLINIC | Age: 55
End: 2025-05-20
Payer: COMMERCIAL

## 2025-05-20 VITALS
SYSTOLIC BLOOD PRESSURE: 93 MMHG | HEART RATE: 86 BPM | WEIGHT: 139 LBS | HEIGHT: 58 IN | BODY MASS INDEX: 29.18 KG/M2 | OXYGEN SATURATION: 97 % | DIASTOLIC BLOOD PRESSURE: 61 MMHG

## 2025-05-20 DIAGNOSIS — R07.2 PRECORDIAL PAIN: ICD-10-CM

## 2025-05-20 DIAGNOSIS — R06.09 DOE (DYSPNEA ON EXERTION): ICD-10-CM

## 2025-05-20 DIAGNOSIS — R07.9 CHEST PAIN, UNSPECIFIED TYPE: ICD-10-CM

## 2025-05-20 DIAGNOSIS — E78.2 MIXED HYPERLIPIDEMIA: ICD-10-CM

## 2025-05-20 DIAGNOSIS — R00.2 PALPITATIONS: ICD-10-CM

## 2025-05-20 DIAGNOSIS — R06.09 DOE (DYSPNEA ON EXERTION): Primary | ICD-10-CM

## 2025-05-20 LAB — BODY SURFACE AREA: 1.61 M2

## 2025-05-20 PROCEDURE — 93242 EXT ECG>48HR<7D RECORDING: CPT

## 2025-05-20 PROCEDURE — 1036F TOBACCO NON-USER: CPT | Performed by: STUDENT IN AN ORGANIZED HEALTH CARE EDUCATION/TRAINING PROGRAM

## 2025-05-20 PROCEDURE — 3008F BODY MASS INDEX DOCD: CPT | Performed by: STUDENT IN AN ORGANIZED HEALTH CARE EDUCATION/TRAINING PROGRAM

## 2025-05-20 PROCEDURE — 99204 OFFICE O/P NEW MOD 45 MIN: CPT | Performed by: STUDENT IN AN ORGANIZED HEALTH CARE EDUCATION/TRAINING PROGRAM

## 2025-05-20 PROCEDURE — 99214 OFFICE O/P EST MOD 30 MIN: CPT | Performed by: STUDENT IN AN ORGANIZED HEALTH CARE EDUCATION/TRAINING PROGRAM

## 2025-05-20 ASSESSMENT — ENCOUNTER SYMPTOMS
OCCASIONAL FEELINGS OF UNSTEADINESS: 0
DEPRESSION: 0
LOSS OF SENSATION IN FEET: 0

## 2025-05-20 ASSESSMENT — PATIENT HEALTH QUESTIONNAIRE - PHQ9
2. FEELING DOWN, DEPRESSED OR HOPELESS: SEVERAL DAYS
1. LITTLE INTEREST OR PLEASURE IN DOING THINGS: NOT AT ALL
SUM OF ALL RESPONSES TO PHQ9 QUESTIONS 1 AND 2: 1

## 2025-05-20 ASSESSMENT — COLUMBIA-SUICIDE SEVERITY RATING SCALE - C-SSRS
1. IN THE PAST MONTH, HAVE YOU WISHED YOU WERE DEAD OR WISHED YOU COULD GO TO SLEEP AND NOT WAKE UP?: NO
6. HAVE YOU EVER DONE ANYTHING, STARTED TO DO ANYTHING, OR PREPARED TO DO ANYTHING TO END YOUR LIFE?: NO
2. HAVE YOU ACTUALLY HAD ANY THOUGHTS OF KILLING YOURSELF?: NO

## 2025-05-20 ASSESSMENT — PAIN SCALES - GENERAL: PAINLEVEL_OUTOF10: 0-NO PAIN

## 2025-05-20 NOTE — PROGRESS NOTES
Referred by Dr. Martinez for new pt and Chest Pain (No complaints as of now)     History Of Present Illness:    Amy Gutierrez is a 54 y.o. female past med history notable for hypothyroidism, anemia, hyperlipidemia presents to us for atypical chest discomfort and palpitations.  Patient states she has been having on and off episodes of which she describes as tightness in her chest followed by rapid heartbeats typically relieved if she drinks a large bottle of water these episodes are coming and going.  There is no clear triggers in her mind.  She has associated shortness of breath.  She exerts herself she is now more tired than normal.  She is treated for hypothyroidism and is on levothyroxine 50 mcg daily which has helped with her symptoms.  She is on rosuvastatin for cholesterol management.  Patient denies any tobacco or heavy consumption.  She denies family history Cardiovascular events.      Past Medical History:  She has a past medical history of Encounter for immunization, Hyperlipidemia, unspecified (2021), Other conditions influencing health status (2019), and Personal history of other medical treatment (2022).    Past Surgical History:  She has a past surgical history that includes  section, classic (2018).      Social History:  She reports that she has never smoked. She has never used smokeless tobacco. She reports that she does not drink alcohol and does not use drugs.    Family History:  Family History[1]     Allergies:  Patient has no known allergies.    Outpatient Medications:  Current Outpatient Medications   Medication Instructions    coenzyme Q-10 (COQ-10) 100 mg, oral, Daily    ergocalciferol (VITAMIN D-2) 1,250 mcg, oral, Once Weekly    estradiol (Climara) 0.05 mg/24 hr patch 1 patch, transdermal, Weekly    levothyroxine (SYNTHROID, LEVOXYL) 50 mcg, oral, Daily, as directed    mirabegron (MYRBETRIQ) 25 mg, oral, Daily    rosuvastatin (CRESTOR) 10 mg, oral, Nightly     "sertraline (ZOLOFT) 25 mg, oral, Daily        Last Recorded Vitals:  Vitals:    05/20/25 1116   BP: 93/61   BP Location: Left arm   Patient Position: Sitting   BP Cuff Size: Adult   Pulse: 86   SpO2: 97%   Weight: 63 kg (139 lb)   Height: 1.473 m (4' 10\")       Physical Exam:  General: No acute distress,  A&O x3  Skin: Warm and dry  Neck: JVD is not elevated  ENT: Moist mucous membranes no lesions appreciated  Pulmonary: CTAB  Cards: Regular rate rhythm, no murmurs gallops or rubs appreciated normal S1-S2  Abdomen: Soft nontender nondistended  Extremities: No edema or cyanosis  Psych: Appropriate mood and affect     @PESEC->PESEC(CIRCULAR REFERENCE)@       Last Labs:  CBC -  Lab Results   Component Value Date    WBC 5.6 10/01/2024    HGB 11.9 (L) 10/01/2024    HCT 37.1 10/01/2024    MCV 79 (L) 10/01/2024     10/01/2024       CMP -  Lab Results   Component Value Date    CALCIUM 9.7 01/13/2025    PROT 7.7 01/13/2025    ALBUMIN 4.6 01/13/2025    AST 22 01/13/2025    ALT 25 01/13/2025    ALKPHOS 49 01/13/2025    BILITOT 0.4 01/13/2025       LIPID PANEL -   Lab Results   Component Value Date    CHOL 277 (H) 01/13/2025    TRIG 227 (H) 01/13/2025    HDL 49.7 01/13/2025    CHHDL 5.6 01/13/2025    LDLF 83 08/22/2022    VLDL 45 (H) 01/13/2025    NHDL 227 (H) 01/13/2025       RENAL FUNCTION PANEL -   Lab Results   Component Value Date    GLUCOSE 83 01/13/2025     01/13/2025    K 4.0 01/13/2025     01/13/2025    CO2 29 01/13/2025    ANIONGAP 13 01/13/2025    BUN 15 01/13/2025    CREATININE 0.57 01/13/2025    CALCIUM 9.7 01/13/2025    ALBUMIN 4.6 01/13/2025        No results found for: \"BNP\", \"HGBA1C\"    Last Cardiology Tests:  ECG:  ECG 12 lead (Clinic Performed) 03/12/2025    Assessment/Plan     1.  Chest discomfort/palpitations/rapid heartbeats: Sporadic events without clear triggers.  Symptoms are traditionally relieved with drinking large bottle of water.  Baseline EKG reviewed.  There is association with " shortness of breath particular with exertion.  Risk factors include hyperlipidemia.  Symptoms may be related to changes in thyroxine levels as she is on levothyroxine for hypothyroidism.  Additionally symptoms could be related to menopausal changes.  However, for further workup we will perform the following.    -Provide 14-day Holter monitor  - Check iron studies  - Echocardiogram  - Stress echocardiogram    Return to clinic once testing is completed.    (This note was generated with voice recognition software and may contain errors including spelling, grammar, syntax and missed recognition of what was dictated, of which may not have been fully corrected)     Al Montague MD PhD       [1] No family history on file.

## 2025-05-28 ENCOUNTER — APPOINTMENT (OUTPATIENT)
Dept: PRIMARY CARE | Facility: CLINIC | Age: 55
End: 2025-05-28
Payer: COMMERCIAL

## 2025-06-17 LAB — BODY SURFACE AREA: 1.61 M2

## 2025-06-18 ENCOUNTER — HOSPITAL ENCOUNTER (OUTPATIENT)
Dept: CARDIOLOGY | Facility: CLINIC | Age: 55
Discharge: HOME | End: 2025-06-18
Payer: COMMERCIAL

## 2025-06-18 DIAGNOSIS — R07.2 PRECORDIAL PAIN: ICD-10-CM

## 2025-06-18 DIAGNOSIS — R06.09 DOE (DYSPNEA ON EXERTION): ICD-10-CM

## 2025-06-18 DIAGNOSIS — R07.9 CHEST PAIN, UNSPECIFIED TYPE: ICD-10-CM

## 2025-06-18 DIAGNOSIS — R00.2 PALPITATIONS: ICD-10-CM

## 2025-06-18 PROCEDURE — 93306 TTE W/DOPPLER COMPLETE: CPT | Performed by: STUDENT IN AN ORGANIZED HEALTH CARE EDUCATION/TRAINING PROGRAM

## 2025-06-18 PROCEDURE — 93306 TTE W/DOPPLER COMPLETE: CPT

## 2025-06-19 ENCOUNTER — APPOINTMENT (OUTPATIENT)
Dept: CARDIOLOGY | Facility: CLINIC | Age: 55
End: 2025-06-19
Payer: COMMERCIAL

## 2025-06-22 LAB
AORTIC VALVE PEAK VELOCITY: 1.16 M/S
AV PEAK GRADIENT: 5 MMHG
AVA (PEAK VEL): 2.68 CM2
EJECTION FRACTION APICAL 4 CHAMBER: 65.1
EJECTION FRACTION: 63 %
LEFT ATRIUM VOLUME AREA LENGTH INDEX BSA: 21.1 ML/M2
LEFT VENTRICLE INTERNAL DIMENSION DIASTOLE: 4.34 CM (ref 3.5–6)
LEFT VENTRICULAR OUTFLOW TRACT DIAMETER: 2 CM
MITRAL VALVE E/A RATIO: 1.05
RIGHT VENTRICLE FREE WALL PEAK S': 9.03 CM/S
RIGHT VENTRICLE PEAK SYSTOLIC PRESSURE: 24 MMHG
TRICUSPID ANNULAR PLANE SYSTOLIC EXCURSION: 1.5 CM

## 2025-07-07 ENCOUNTER — APPOINTMENT (OUTPATIENT)
Dept: PRIMARY CARE | Facility: CLINIC | Age: 55
End: 2025-07-07
Payer: COMMERCIAL

## 2025-07-07 VITALS
TEMPERATURE: 98.3 F | WEIGHT: 137 LBS | DIASTOLIC BLOOD PRESSURE: 50 MMHG | BODY MASS INDEX: 28.76 KG/M2 | HEIGHT: 58 IN | HEART RATE: 68 BPM | OXYGEN SATURATION: 98 % | SYSTOLIC BLOOD PRESSURE: 116 MMHG | RESPIRATION RATE: 18 BRPM

## 2025-07-07 DIAGNOSIS — E78.5 DYSLIPIDEMIA: ICD-10-CM

## 2025-07-07 DIAGNOSIS — Z23 NEED FOR SHINGLES VACCINE: ICD-10-CM

## 2025-07-07 DIAGNOSIS — E03.9 ACQUIRED HYPOTHYROIDISM: Primary | ICD-10-CM

## 2025-07-07 DIAGNOSIS — N95.1 MENOPAUSAL STATE: ICD-10-CM

## 2025-07-07 DIAGNOSIS — Z12.31 ENCOUNTER FOR SCREENING MAMMOGRAM FOR BREAST CANCER: ICD-10-CM

## 2025-07-07 DIAGNOSIS — E78.2 MIXED HYPERLIPIDEMIA: ICD-10-CM

## 2025-07-07 DIAGNOSIS — E55.9 VITAMIN D DEFICIENCY: ICD-10-CM

## 2025-07-07 PROCEDURE — 90750 HZV VACC RECOMBINANT IM: CPT | Performed by: FAMILY MEDICINE

## 2025-07-07 PROCEDURE — 99214 OFFICE O/P EST MOD 30 MIN: CPT | Performed by: FAMILY MEDICINE

## 2025-07-07 PROCEDURE — 90471 IMMUNIZATION ADMIN: CPT | Performed by: FAMILY MEDICINE

## 2025-07-07 RX ORDER — ROSUVASTATIN CALCIUM 10 MG/1
10 TABLET, COATED ORAL NIGHTLY
Qty: 90 TABLET | Refills: 1 | Status: SHIPPED | OUTPATIENT
Start: 2025-07-07

## 2025-07-07 RX ORDER — ESTRADIOL 0.05 MG/D
1 PATCH TRANSDERMAL WEEKLY
Qty: 4 PATCH | Refills: 2 | Status: SHIPPED | OUTPATIENT
Start: 2025-07-07 | End: 2025-09-29

## 2025-07-07 RX ORDER — ERGOCALCIFEROL 1.25 MG/1
1.25 CAPSULE ORAL
Qty: 12 CAPSULE | Refills: 1 | Status: SHIPPED | OUTPATIENT
Start: 2025-07-07

## 2025-07-07 RX ORDER — LEVOTHYROXINE SODIUM 50 UG/1
50 TABLET ORAL DAILY
Qty: 90 TABLET | Refills: 1 | Status: SHIPPED | OUTPATIENT
Start: 2025-07-07

## 2025-07-07 ASSESSMENT — ENCOUNTER SYMPTOMS
DEPRESSION: 0
LOSS OF SENSATION IN FEET: 0
OCCASIONAL FEELINGS OF UNSTEADINESS: 0

## 2025-07-07 ASSESSMENT — PATIENT HEALTH QUESTIONNAIRE - PHQ9
1. LITTLE INTEREST OR PLEASURE IN DOING THINGS: NOT AT ALL
SUM OF ALL RESPONSES TO PHQ9 QUESTIONS 1 AND 2: 0
2. FEELING DOWN, DEPRESSED OR HOPELESS: NOT AT ALL

## 2025-07-07 NOTE — PROGRESS NOTES
Subjective   Patient ID: Amy Gutierrez is a 54 y.o. female who presents for 3 months follow up (Discuss singles vaccine).  History of Present Illness  The patient is a 54-year-old female who comes in for a 3-month follow-up. The primary reason for this visit is to discuss the shingles vaccine.    She has a history of chickenpox from her childhood and currently experiences shingles outbreaks on her face when her immune system is compromised. She is considering the shingles vaccine to prevent future outbreaks.    She reports no recent illnesses or abdominal issues but does experience occasional muscle cramps. She has recently increased her salt intake slightly. She is currently taking CoQ10, vitamin D, Climara patch, thyroid medication, Myrbetriq, rosuvastatin, and sertraline. She reports feeling well overall.    She is on rosuvastatin for cholesterol management but did not take it during her recent one-week vacation. She expresses concern about potential long-term effects of the medication, including feelings of lethargy and memory issues. These symptoms seem to improve when she temporarily discontinues the medication. She is interested in exploring alternative treatments, such as injections every six months.    Review of Systems   Constitutional: Negative.  Negative for activity change, appetite change, fatigue and fever.   HENT:  Negative for congestion, dental problem, ear discharge, ear pain, mouth sores, rhinorrhea, sinus pressure, sinus pain, sore throat, tinnitus and trouble swallowing.    Eyes:  Negative for photophobia, pain and visual disturbance.   Respiratory:  Negative for cough, chest tightness, shortness of breath and stridor.    Cardiovascular:  Negative for chest pain and palpitations.   Gastrointestinal:  Negative for abdominal distention, abdominal pain, blood in stool, constipation, diarrhea and rectal pain.   Endocrine: Negative for cold intolerance, heat intolerance, polydipsia, polyphagia and  "polyuria.   Genitourinary:  Negative for dysuria, flank pain, hematuria and urgency.   Musculoskeletal:  Negative for arthralgias, gait problem, myalgias and neck stiffness.   Skin:  Negative for color change and rash.   Allergic/Immunologic: Negative for environmental allergies and food allergies.   Neurological:  Negative for dizziness, seizures, syncope, speech difficulty and headaches.   Hematological:  Negative for adenopathy.   Psychiatric/Behavioral:  Negative for agitation, confusion, decreased concentration, dysphoric mood and sleep disturbance. The patient is not nervous/anxious.    The above were reviewed and noted negative except as noted in HPI and Problem List.    Objective     /50 (BP Location: Left arm, Patient Position: Sitting, BP Cuff Size: Adult)   Pulse 68   Temp 36.8 °C (98.3 °F) (Temporal)   Resp 18   Ht (!) 1.473 m (4' 10\")   Wt 62.1 kg (137 lb)   SpO2 98%   BMI 28.63 kg/m²      Physical Exam  Neck: Thyroid appears normal.  Respiratory: Clear to auscultation, no wheezing, rales or rhonchi.  Constitutional: Well developed, well nourished, alert and in no acute distress   Eyes: Normal external exam. Pupils equally round and reactive to light with normal accommodation and extraocular movements intact.  Neck: Supple, no lymphadenopathy or masses.   Cardiovascular: Regular rate and rhythm, normal S1 and S2, no murmurs, gallops, or rubs. Radial pulses normal. No peripheral edema.  Pulmonary: No respiratory distress, lungs clear to auscultation bilaterally. No wheezes, rhonchi, rales.  Abdomen: soft,non tender, non distended, without masses or HSM  Skin: Warm, well perfused, normal skin turgor and color.   Neurologic: Cranial nerves II-XII grossly intact.   Psychiatric: Mood calm and affect normal  Musculoskeletal: Moving all extremities without restriction  The above were reviewed and noted negative except as noted in HPI and Problem List.    Problem List Items Addressed This Visit  "      Acquired hypothyroidism - Primary    Relevant Medications    levothyroxine (Synthroid, Levoxyl) 50 mcg tablet    Hyperlipidemia    Relevant Medications    ergocalciferol (Vitamin D-2) 1250 mcg (50,000 units) capsule    rosuvastatin (Crestor) 10 mg tablet    Vitamin D deficiency    Relevant Medications    ergocalciferol (Vitamin D-2) 1250 mcg (50,000 units) capsule     Other Visit Diagnoses         Menopausal state        Relevant Medications    estradiol (Climara) 0.05 mg/24 hr patch      Dyslipidemia        Relevant Medications    ergocalciferol (Vitamin D-2) 1250 mcg (50,000 units) capsule    rosuvastatin (Crestor) 10 mg tablet    Other Relevant Orders    Lipid Panel    Comprehensive Metabolic Panel      Encounter for screening mammogram for breast cancer        Relevant Orders    BI mammo bilateral screening tomosynthesis      Need for shingles vaccine                 Assessment & Plan  1. Health Maintenance.  - Blood pressure is 116/50.  - Last mammogram conducted in 10/2024 showed no abnormalities.  - Shingles vaccine will be administered today, with a booster scheduled for 2 to 6 months later.  - Repeat cholesterol test and liver function tests will be conducted at the end of summer 2025.    2. Cholesterol Management.  - Currently taking rosuvastatin (Crestor) for cholesterol management.  - Reported discontinuing the medication during a recent vacation, which may have contributed to elevated cholesterol levels.  - Repeat cholesterol test will be ordered to monitor levels while on rosuvastatin.  - Information on Leqvio will be provided for consideration as an alternative treatment option.    3. Medication Management.  - Currently taking CoQ10, vitamin D, Climara patch, thyroid medication, Myrbetriq, rosuvastatin (Crestor), and sertraline (Zoloft).  - Prescriptions for Climara patch, rosuvastatin (Crestor), and sertraline (Zoloft) will be refilled and sent to pharmacy.  - Thyroid function is stable with TSH  at 2.04 as of 01/13/2025.    4. Follow-up.  - Follow-up visit scheduled in 5 months.  - Blood work to be completed before the next visit.    Results  Labs   - TSH: 01/13/2025, 2.04   - Lipid Panel - HDL: 49.3   - Lipid Panel - Total Cholesterol/HDL Ratio: 5.6   - Lipid Panel - LDL: 182   - Blood Glucose: 83   - Sodium: 140   - Potassium: 4.0   - Urea Nitrogen: 15   - Creatinine: 0.57   - Glomerular Filtration Rate: 90   - AST: 22   - ALT: 25       Panfilo Martinez DO     This medical note was created with the assistance of artificial intelligence (AI) for documentation purposes. The content has been reviewed and confirmed by the healthcare provider for accuracy and completeness. Patient consented to the use of audio recording and use of AI during their visit.

## 2025-07-07 NOTE — PATIENT INSTRUCTIONS
Follow up in 5 months    Continue current medications and therapy for chronic medical conditions.    Patient was advised importance of proper diet/nutrition in addition adequate hydration. Patient was encouraged moderate exercise program to include 30 minutes daily for 5 days of the week or 150 minutes weekly. Patient will follow-up with us as scheduled.

## 2025-07-18 ENCOUNTER — TELEPHONE (OUTPATIENT)
Dept: PRIMARY CARE | Facility: CLINIC | Age: 55
End: 2025-07-18

## 2026-01-08 ENCOUNTER — APPOINTMENT (OUTPATIENT)
Dept: PRIMARY CARE | Facility: CLINIC | Age: 56
End: 2026-01-08
Payer: COMMERCIAL